# Patient Record
Sex: MALE | Race: WHITE | NOT HISPANIC OR LATINO | Employment: OTHER | URBAN - METROPOLITAN AREA
[De-identification: names, ages, dates, MRNs, and addresses within clinical notes are randomized per-mention and may not be internally consistent; named-entity substitution may affect disease eponyms.]

---

## 2020-09-01 DIAGNOSIS — R94.39 ABNORMAL RESULT OF OTHER CARDIOVASCULAR FUNCTION STUDY: Primary | ICD-10-CM

## 2020-09-04 RX ORDER — VALSARTAN AND HYDROCHLOROTHIAZIDE 320; 12.5 MG/1; MG/1
1 TABLET, FILM COATED ORAL DAILY
COMMUNITY

## 2020-09-04 RX ORDER — RIVAROXABAN 20 MG/1
20 TABLET, FILM COATED ORAL DAILY
COMMUNITY

## 2020-09-04 RX ORDER — METOPROLOL SUCCINATE 50 MG/1
50 TABLET, EXTENDED RELEASE ORAL DAILY
Status: ON HOLD | COMMUNITY
End: 2020-09-17 | Stop reason: HOSPADM

## 2020-09-04 RX ORDER — POTASSIUM CHLORIDE 750 MG/1
10 TABLET, EXTENDED RELEASE ORAL ONCE
Status: ON HOLD | COMMUNITY
End: 2020-09-17 | Stop reason: HOSPADM

## 2020-09-04 RX ORDER — MELOXICAM 7.5 MG/1
7.5 TABLET ORAL 2 TIMES DAILY
Status: ON HOLD | COMMUNITY
End: 2020-09-17 | Stop reason: HOSPADM

## 2020-09-04 RX ORDER — AMLODIPINE BESYLATE 5 MG/1
5 TABLET ORAL DAILY
COMMUNITY
End: 2020-09-30

## 2020-09-05 ENCOUNTER — LAB VISIT (OUTPATIENT)
Dept: PRIMARY CARE CLINIC | Facility: CLINIC | Age: 70
End: 2020-09-05
Payer: MEDICARE

## 2020-09-05 DIAGNOSIS — R94.39 ABNORMAL RESULT OF OTHER CARDIOVASCULAR FUNCTION STUDY: ICD-10-CM

## 2020-09-05 PROCEDURE — U0003 INFECTIOUS AGENT DETECTION BY NUCLEIC ACID (DNA OR RNA); SEVERE ACUTE RESPIRATORY SYNDROME CORONAVIRUS 2 (SARS-COV-2) (CORONAVIRUS DISEASE [COVID-19]), AMPLIFIED PROBE TECHNIQUE, MAKING USE OF HIGH THROUGHPUT TECHNOLOGIES AS DESCRIBED BY CMS-2020-01-R: HCPCS

## 2020-09-07 LAB — SARS-COV-2 RNA RESP QL NAA+PROBE: NOT DETECTED

## 2020-09-08 ENCOUNTER — HOSPITAL ENCOUNTER (INPATIENT)
Facility: HOSPITAL | Age: 70
LOS: 9 days | Discharge: HOME | DRG: 234 | End: 2020-09-17
Attending: SPECIALIST | Admitting: SPECIALIST
Payer: MEDICARE

## 2020-09-08 DIAGNOSIS — Z95.1 HX OF CABG: ICD-10-CM

## 2020-09-08 DIAGNOSIS — I25.10 ATHEROSCLEROSIS OF NATIVE CORONARY ARTERY OF NATIVE HEART WITHOUT ANGINA PECTORIS: ICD-10-CM

## 2020-09-08 DIAGNOSIS — I25.10 ATHEROSCLEROSIS OF NATIVE CORONARY ARTERY WITHOUT ANGINA PECTORIS: ICD-10-CM

## 2020-09-08 DIAGNOSIS — Z01.818 PRE-OP EVALUATION: ICD-10-CM

## 2020-09-08 DIAGNOSIS — Z95.0 PACEMAKER: ICD-10-CM

## 2020-09-08 DIAGNOSIS — I48.20 CHRONIC ATRIAL FIBRILLATION: ICD-10-CM

## 2020-09-08 DIAGNOSIS — R94.39 ABNORMAL STRESS TEST: ICD-10-CM

## 2020-09-08 DIAGNOSIS — Z09 POSTOP CHECK: ICD-10-CM

## 2020-09-08 DIAGNOSIS — I25.10 CAD, MULTIPLE VESSEL: Primary | ICD-10-CM

## 2020-09-08 DIAGNOSIS — Z95.1 S/P CABG X 4: ICD-10-CM

## 2020-09-08 DIAGNOSIS — I10 ESSENTIAL HYPERTENSION: ICD-10-CM

## 2020-09-08 PROBLEM — I25.110 ATHEROSCLEROTIC HEART DISEASE OF NATIVE CORONARY ARTERY WITH UNSTABLE ANGINA PECTORIS: Status: ACTIVE | Noted: 2020-09-08

## 2020-09-08 LAB — CATH EF QUANTITATIVE: 55 %

## 2020-09-08 PROCEDURE — 99232 SBSQ HOSP IP/OBS MODERATE 35: CPT | Mod: ,,, | Performed by: THORACIC SURGERY (CARDIOTHORACIC VASCULAR SURGERY)

## 2020-09-08 PROCEDURE — 21400001 HC TELEMETRY ROOM

## 2020-09-08 PROCEDURE — 25000003 PHARM REV CODE 250: Performed by: SPECIALIST

## 2020-09-08 PROCEDURE — 93459 L HRT ART/GRFT ANGIO: CPT

## 2020-09-08 PROCEDURE — C1725 CATH, TRANSLUMIN NON-LASER: HCPCS | Performed by: SPECIALIST

## 2020-09-08 PROCEDURE — 99153 MOD SED SAME PHYS/QHP EA: CPT | Performed by: SPECIALIST

## 2020-09-08 PROCEDURE — 25000003 PHARM REV CODE 250: Performed by: INTERNAL MEDICINE

## 2020-09-08 PROCEDURE — 27201423 OPTIME MED/SURG SUP & DEVICES STERILE SUPPLY: Performed by: SPECIALIST

## 2020-09-08 PROCEDURE — 99152 MOD SED SAME PHYS/QHP 5/>YRS: CPT | Performed by: SPECIALIST

## 2020-09-08 PROCEDURE — 25500020 PHARM REV CODE 255: Performed by: SPECIALIST

## 2020-09-08 PROCEDURE — C1769 GUIDE WIRE: HCPCS | Performed by: SPECIALIST

## 2020-09-08 PROCEDURE — 96374 THER/PROPH/DIAG INJ IV PUSH: CPT | Performed by: SPECIALIST

## 2020-09-08 PROCEDURE — 99232 PR SUBSEQUENT HOSPITAL CARE,LEVL II: ICD-10-PCS | Mod: ,,, | Performed by: THORACIC SURGERY (CARDIOTHORACIC VASCULAR SURGERY)

## 2020-09-08 PROCEDURE — 63600175 PHARM REV CODE 636 W HCPCS: Performed by: SPECIALIST

## 2020-09-08 PROCEDURE — C1887 CATHETER, GUIDING: HCPCS | Performed by: SPECIALIST

## 2020-09-08 PROCEDURE — 21000000 HC CCU ICU ROOM CHARGE

## 2020-09-08 PROCEDURE — C1894 INTRO/SHEATH, NON-LASER: HCPCS | Performed by: SPECIALIST

## 2020-09-08 PROCEDURE — C1760 CLOSURE DEV, VASC: HCPCS | Performed by: SPECIALIST

## 2020-09-08 DEVICE — DEVICE CLOSURE  ANGIO-SEAL 6FR 610130: Type: IMPLANTABLE DEVICE | Site: GROIN | Status: FUNCTIONAL

## 2020-09-08 RX ORDER — METOPROLOL SUCCINATE 50 MG/1
50 TABLET, EXTENDED RELEASE ORAL DAILY
Status: DISCONTINUED | OUTPATIENT
Start: 2020-09-09 | End: 2020-09-15

## 2020-09-08 RX ORDER — SODIUM CHLORIDE 9 MG/ML
INJECTION, SOLUTION INTRAVENOUS CONTINUOUS
Status: DISCONTINUED | OUTPATIENT
Start: 2020-09-08 | End: 2020-09-08

## 2020-09-08 RX ORDER — HYDRALAZINE HYDROCHLORIDE 20 MG/ML
10 INJECTION INTRAMUSCULAR; INTRAVENOUS ONCE
Status: COMPLETED | OUTPATIENT
Start: 2020-09-08 | End: 2020-09-08

## 2020-09-08 RX ORDER — POTASSIUM CHLORIDE 750 MG/1
10 CAPSULE, EXTENDED RELEASE ORAL ONCE
Status: DISCONTINUED | OUTPATIENT
Start: 2020-09-08 | End: 2020-09-09

## 2020-09-08 RX ORDER — ATORVASTATIN CALCIUM 40 MG/1
40 TABLET, FILM COATED ORAL DAILY
Status: DISCONTINUED | OUTPATIENT
Start: 2020-09-09 | End: 2020-09-17 | Stop reason: HOSPADM

## 2020-09-08 RX ORDER — CYCLOBENZAPRINE HCL 5 MG
5 TABLET ORAL 2 TIMES DAILY PRN
Status: DISCONTINUED | OUTPATIENT
Start: 2020-09-08 | End: 2020-09-09

## 2020-09-08 RX ORDER — AMLODIPINE BESYLATE 5 MG/1
5 TABLET ORAL DAILY
Status: DISCONTINUED | OUTPATIENT
Start: 2020-09-09 | End: 2020-09-17 | Stop reason: HOSPADM

## 2020-09-08 RX ORDER — VALSARTAN 80 MG/1
320 TABLET ORAL DAILY
Status: DISCONTINUED | OUTPATIENT
Start: 2020-09-09 | End: 2020-09-11

## 2020-09-08 RX ORDER — MIDAZOLAM HYDROCHLORIDE 1 MG/ML
INJECTION INTRAMUSCULAR; INTRAVENOUS
Status: DISCONTINUED | OUTPATIENT
Start: 2020-09-08 | End: 2020-09-08

## 2020-09-08 RX ORDER — FENTANYL CITRATE 50 UG/ML
INJECTION, SOLUTION INTRAMUSCULAR; INTRAVENOUS
Status: DISCONTINUED | OUTPATIENT
Start: 2020-09-08 | End: 2020-09-08

## 2020-09-08 RX ORDER — MELOXICAM 7.5 MG/1
7.5 TABLET ORAL 2 TIMES DAILY PRN
Status: DISCONTINUED | OUTPATIENT
Start: 2020-09-08 | End: 2020-09-09

## 2020-09-08 RX ORDER — CLONIDINE HYDROCHLORIDE 0.1 MG/1
0.2 TABLET ORAL EVERY 6 HOURS PRN
Status: DISCONTINUED | OUTPATIENT
Start: 2020-09-08 | End: 2020-09-17 | Stop reason: HOSPADM

## 2020-09-08 RX ORDER — EZETIMIBE 10 MG/1
10 TABLET ORAL DAILY
Status: DISCONTINUED | OUTPATIENT
Start: 2020-09-09 | End: 2020-09-17 | Stop reason: HOSPADM

## 2020-09-08 RX ORDER — TAMSULOSIN HYDROCHLORIDE 0.4 MG/1
0.4 CAPSULE ORAL DAILY
Status: DISCONTINUED | OUTPATIENT
Start: 2020-09-09 | End: 2020-09-09

## 2020-09-08 RX ORDER — LIDOCAINE HYDROCHLORIDE 10 MG/ML
INJECTION, SOLUTION EPIDURAL; INFILTRATION; INTRACAUDAL; PERINEURAL
Status: DISCONTINUED | OUTPATIENT
Start: 2020-09-08 | End: 2020-09-08

## 2020-09-08 RX ORDER — POTASSIUM CHLORIDE 20 MEQ/1
40 TABLET, EXTENDED RELEASE ORAL
Status: DISCONTINUED | OUTPATIENT
Start: 2020-09-08 | End: 2020-09-08

## 2020-09-08 RX ADMIN — HYDRALAZINE HYDROCHLORIDE 10 MG: 20 INJECTION INTRAMUSCULAR; INTRAVENOUS at 04:09

## 2020-09-08 RX ADMIN — CLONIDINE HYDROCHLORIDE 0.2 MG: 0.1 TABLET ORAL at 06:09

## 2020-09-08 RX ADMIN — POTASSIUM CHLORIDE 40 MEQ: 20 TABLET, EXTENDED RELEASE ORAL at 06:09

## 2020-09-08 RX ADMIN — SODIUM CHLORIDE: 0.9 INJECTION, SOLUTION INTRAVENOUS at 06:09

## 2020-09-08 NOTE — Clinical Note
Catheter is inserted into the ostium   left main. Angiography performed of the left coronary arteries in multiple views. Angiography performed via power injection. Injection was 8 mL contrast at 4 mL/s. Power injection PSI was 450.. JL4

## 2020-09-08 NOTE — Clinical Note
Catheter is inserted into the ostium   right coronary artery. Angiography performed of the right coronary arteries in multiple views. Angiography performed via power injection. Injection was 6 mL contrast at 3 mL/s. Power injection PSI was 300.. 4

## 2020-09-08 NOTE — Clinical Note
Catheter is inserted into the left ventricle. Angiography performed of the LV. Angiography performed via power injection. Injection was 24 mL contrast at 12 mL/s. Power injection PSI was 700.. pigtail

## 2020-09-08 NOTE — CONSULTS
This is Dr. Saravia dictating with date of service being 8th September 2020.  This patient has severe coronary artery disease.  He underwent heart catheterization and coronary angiography today showing significant coronary artery disease involving the left main coronary artery as well as the right coronary artery.  His left ventricular function appears to be fairly well preserved.  He has chronic atrial fibrillation for which she underwent 2 separate sessions of ablation for this and apparently this has been successful.  He still was taking prior to admission the Xarelto.  This has been discontinued.  He has had hypertension.  His other issues well described.  He has had no major recent surgery.  Family history is not pertinent at this time.  He is not a smoker.  On exam vital signs are stable.  Pupils are equal and round reactive to light.  Neck is supple.  Chest is equal breath sounds.  Heart is in a regular rate and rhythm.  Abdomen is benign.  Perfusion to the legs and feet seems to be satisfactory.  Recent studies were reviewed and the patient has severe multivessel coronary artery disease.  Left main coronary artery stenosis is noted.  Recommendation is for coronary artery bypass grafting.  I explained this to him and his wife and they both seem to be understanding and agreeable.  This will be arranged in the next 1-2 days.

## 2020-09-09 PROBLEM — I10 HTN (HYPERTENSION): Status: ACTIVE | Noted: 2020-09-09

## 2020-09-09 PROBLEM — Z95.0 PACEMAKER: Status: ACTIVE | Noted: 2020-09-09

## 2020-09-09 PROBLEM — I25.10 CAD, MULTIPLE VESSEL: Status: ACTIVE | Noted: 2020-09-09

## 2020-09-09 PROBLEM — I48.20 CHRONIC ATRIAL FIBRILLATION: Status: ACTIVE | Noted: 2020-09-09

## 2020-09-09 LAB
ALBUMIN SERPL BCP-MCNC: 3.9 G/DL (ref 3.5–5.2)
ALP SERPL-CCNC: 72 U/L (ref 55–135)
ALT SERPL W/O P-5'-P-CCNC: 34 U/L (ref 10–44)
ANION GAP SERPL CALC-SCNC: 10 MMOL/L (ref 8–16)
AST SERPL-CCNC: 28 U/L (ref 10–40)
BILIRUB SERPL-MCNC: 1.3 MG/DL (ref 0.1–1)
BUN SERPL-MCNC: 21 MG/DL (ref 8–23)
CALCIUM SERPL-MCNC: 9.1 MG/DL (ref 8.7–10.5)
CHLORIDE SERPL-SCNC: 104 MMOL/L (ref 95–110)
CO2 SERPL-SCNC: 27 MMOL/L (ref 23–29)
CREAT SERPL-MCNC: 0.9 MG/DL (ref 0.5–1.4)
ERYTHROCYTE [DISTWIDTH] IN BLOOD BY AUTOMATED COUNT: 12.6 % (ref 11.5–14.5)
EST. GFR  (AFRICAN AMERICAN): >60 ML/MIN/1.73 M^2
EST. GFR  (NON AFRICAN AMERICAN): >60 ML/MIN/1.73 M^2
GLUCOSE SERPL-MCNC: 100 MG/DL (ref 70–110)
HCT VFR BLD AUTO: 39.8 % (ref 40–54)
HGB BLD-MCNC: 13.9 G/DL (ref 14–18)
MAGNESIUM SERPL-MCNC: 2 MG/DL (ref 1.6–2.6)
MCH RBC QN AUTO: 30.9 PG (ref 27–31)
MCHC RBC AUTO-ENTMCNC: 34.9 G/DL (ref 32–36)
MCV RBC AUTO: 88 FL (ref 82–98)
PLATELET # BLD AUTO: 164 K/UL (ref 150–350)
PMV BLD AUTO: 9.3 FL (ref 9.2–12.9)
POTASSIUM SERPL-SCNC: 3.1 MMOL/L (ref 3.5–5.1)
POTASSIUM SERPL-SCNC: 3.3 MMOL/L (ref 3.5–5.1)
PROT SERPL-MCNC: 6.2 G/DL (ref 6–8.4)
RBC # BLD AUTO: 4.5 M/UL (ref 4.6–6.2)
SODIUM SERPL-SCNC: 141 MMOL/L (ref 136–145)
WBC # BLD AUTO: 7.33 K/UL (ref 3.9–12.7)

## 2020-09-09 PROCEDURE — 63600175 PHARM REV CODE 636 W HCPCS: Performed by: INTERNAL MEDICINE

## 2020-09-09 PROCEDURE — 25000003 PHARM REV CODE 250: Performed by: STUDENT IN AN ORGANIZED HEALTH CARE EDUCATION/TRAINING PROGRAM

## 2020-09-09 PROCEDURE — 84132 ASSAY OF SERUM POTASSIUM: CPT

## 2020-09-09 PROCEDURE — 21400001 HC TELEMETRY ROOM

## 2020-09-09 PROCEDURE — 83735 ASSAY OF MAGNESIUM: CPT

## 2020-09-09 PROCEDURE — 80053 COMPREHEN METABOLIC PANEL: CPT

## 2020-09-09 PROCEDURE — 25000003 PHARM REV CODE 250: Performed by: INTERNAL MEDICINE

## 2020-09-09 PROCEDURE — 85027 COMPLETE CBC AUTOMATED: CPT

## 2020-09-09 PROCEDURE — 36415 COLL VENOUS BLD VENIPUNCTURE: CPT

## 2020-09-09 RX ORDER — MAGNESIUM SULFATE HEPTAHYDRATE 40 MG/ML
2 INJECTION, SOLUTION INTRAVENOUS
Status: DISCONTINUED | OUTPATIENT
Start: 2020-09-09 | End: 2020-09-11

## 2020-09-09 RX ORDER — POTASSIUM CHLORIDE 20 MEQ/1
40 TABLET, EXTENDED RELEASE ORAL
Status: DISCONTINUED | OUTPATIENT
Start: 2020-09-09 | End: 2020-09-11

## 2020-09-09 RX ORDER — ENOXAPARIN SODIUM 100 MG/ML
40 INJECTION SUBCUTANEOUS
Status: DISCONTINUED | OUTPATIENT
Start: 2020-09-09 | End: 2020-09-11

## 2020-09-09 RX ORDER — ASPIRIN 81 MG/1
81 TABLET ORAL DAILY
Status: DISCONTINUED | OUTPATIENT
Start: 2020-09-09 | End: 2020-09-17 | Stop reason: HOSPADM

## 2020-09-09 RX ORDER — POTASSIUM CHLORIDE 20 MEQ/1
20 TABLET, EXTENDED RELEASE ORAL
Status: DISCONTINUED | OUTPATIENT
Start: 2020-09-09 | End: 2020-09-11

## 2020-09-09 RX ORDER — LANOLIN ALCOHOL/MO/W.PET/CERES
800 CREAM (GRAM) TOPICAL
Status: DISCONTINUED | OUTPATIENT
Start: 2020-09-09 | End: 2020-09-11

## 2020-09-09 RX ORDER — POTASSIUM CHLORIDE 7.45 MG/ML
20 INJECTION INTRAVENOUS
Status: DISCONTINUED | OUTPATIENT
Start: 2020-09-09 | End: 2020-09-11

## 2020-09-09 RX ORDER — ONDANSETRON 2 MG/ML
4 INJECTION INTRAMUSCULAR; INTRAVENOUS EVERY 6 HOURS PRN
Status: DISCONTINUED | OUTPATIENT
Start: 2020-09-09 | End: 2020-09-11

## 2020-09-09 RX ORDER — POTASSIUM CHLORIDE 7.45 MG/ML
40 INJECTION INTRAVENOUS
Status: DISCONTINUED | OUTPATIENT
Start: 2020-09-09 | End: 2020-09-11

## 2020-09-09 RX ORDER — MAGNESIUM SULFATE 1 G/100ML
1 INJECTION INTRAVENOUS
Status: DISCONTINUED | OUTPATIENT
Start: 2020-09-09 | End: 2020-09-11

## 2020-09-09 RX ORDER — FAMOTIDINE 20 MG/1
20 TABLET, FILM COATED ORAL 2 TIMES DAILY
Status: DISCONTINUED | OUTPATIENT
Start: 2020-09-09 | End: 2020-09-17 | Stop reason: HOSPADM

## 2020-09-09 RX ORDER — ACETAMINOPHEN 325 MG/1
650 TABLET ORAL EVERY 4 HOURS PRN
Status: DISCONTINUED | OUTPATIENT
Start: 2020-09-09 | End: 2020-09-11

## 2020-09-09 RX ORDER — MAGNESIUM SULFATE HEPTAHYDRATE 40 MG/ML
4 INJECTION, SOLUTION INTRAVENOUS
Status: DISCONTINUED | OUTPATIENT
Start: 2020-09-09 | End: 2020-09-11

## 2020-09-09 RX ADMIN — FAMOTIDINE 20 MG: 20 TABLET ORAL at 09:09

## 2020-09-09 RX ADMIN — CLONIDINE HYDROCHLORIDE 0.2 MG: 0.1 TABLET ORAL at 04:09

## 2020-09-09 RX ADMIN — METOPROLOL SUCCINATE 50 MG: 50 TABLET, FILM COATED, EXTENDED RELEASE ORAL at 09:09

## 2020-09-09 RX ADMIN — EZETIMIBE 10 MG: 10 TABLET ORAL at 09:09

## 2020-09-09 RX ADMIN — POTASSIUM CHLORIDE 40 MEQ: 20 TABLET, EXTENDED RELEASE ORAL at 09:09

## 2020-09-09 RX ADMIN — ASPIRIN 81 MG: 81 TABLET, DELAYED RELEASE ORAL at 09:09

## 2020-09-09 RX ADMIN — ATORVASTATIN CALCIUM 40 MG: 40 TABLET, FILM COATED ORAL at 09:09

## 2020-09-09 RX ADMIN — ENOXAPARIN SODIUM 40 MG: 100 INJECTION SUBCUTANEOUS at 04:09

## 2020-09-09 RX ADMIN — VALSARTAN 320 MG: 80 TABLET ORAL at 09:09

## 2020-09-09 RX ADMIN — AMLODIPINE BESYLATE 5 MG: 5 TABLET ORAL at 09:09

## 2020-09-09 NOTE — NURSING
Contacted Dr Pike answering service. Spoke with on-call physician for admission orders, referred to consult Hospitalist per Dr Medina.

## 2020-09-09 NOTE — CONSULTS
[facility] Medicine  Consult Note    Patient Name: Juwan Silver  MRN: [id number]  Admission Date: 9/8/2020  Hospital Length of Stay: 1 days  Attending Physician: Marko Pike MD   Primary Care Provider: Primary Doctor No           Patient information was obtained from patient and ER records.     Consults  Subjective:     Principal Problem: Atherosclerosis of native coronary artery without angina pectoris    Chief Complaint: No chief complaint on file.       HPI: 70 year old patient getting admitted for CABG  Patient has H/o A Fib and had several ablations done in [facility] and has been doing fine  He has PPM insitu  As a part of workup pt underwent Stress test which was abnormal  Later  cardiac cath, done yesterday  showed Multivessel CAD  Patient denies any symptoms/Chest pain . No other issues   CTS team already saw the pt . NOAC put on Hold    Past Medical History:   Diagnosis Date    A-fib     BPH (benign prostatic hyperplasia)     Hernia     Hyperlipidemia     Hypertension        Past Surgical History:   Procedure Laterality Date    ABLATION OF ARRHYTHMOGENIC FOCUS FOR ATRIAL FIBRILLATION      cholesterol      HERNIA REPAIR      hypertension         Review of patient's allergies indicates:   Allergen Reactions    Nitroglycerin Other (See Comments)     Hyper sensitive     No current facility-administered medications on file prior to encounter.      Current Outpatient Medications on File Prior to Encounter   Medication Sig    amLODIPine (NORVASC) 5 MG tablet Take 5 mg by mouth once daily.    atorvastatin (LIPITOR) 40 MG tablet Take 40 mg by mouth once daily.     cyclobenzaprine (FLEXERIL) 5 MG tablet TAKE 1 TABLET BY MOUTH TWICE DAILY AS NEEDED    meloxicam (MOBIC) 7.5 MG tablet Take 7.5 mg by mouth 2 (two) times a day.    metoprolol succinate (TOPROL-XL) 50 MG 24 hr tablet Take 50 mg by mouth once daily.    potassium chloride (KLOR-CON) 10 MEQ TbSR Take 10 mEq by mouth  once.    rivaroxaban (XARELTO) 20 mg Tab Take 20 mg by mouth once daily.    tamsulosin (FLOMAX) 0.4 mg Cp24 0.4 mg once daily.     valsartan-hydrochlorothiazide (DIOVAN-HCT) 320-12.5 mg per tablet Take 1 tablet by mouth once daily.    ZETIA 10 mg tablet Take 10 mg by mouth once daily.      Family History     Family history is unknown by patient.        Family H/o  Older Sister:CAD         Tobacco Use    Smoking status: Never Smoker    Smokeless tobacco: Never Used   Substance and Sexual Activity    Alcohol use: Not Currently     Frequency: Never    Drug use: Never    Sexual activity: Not on file     Review of Systems   Constitutional: Negative for activity change and appetite change.   HENT: Negative for congestion and dental problem.    Eyes: Negative for discharge and itching.   Respiratory: Negative for shortness of breath.    Cardiovascular: Negative for chest pain.   Gastrointestinal: Negative for abdominal distention and abdominal pain.   Endocrine: Negative for cold intolerance.   Genitourinary: Negative for difficulty urinating and dysuria.   Musculoskeletal: Negative for arthralgias and back pain.   Skin: Negative for color change.   Neurological: Negative for dizziness and facial asymmetry.   Hematological: Negative for adenopathy.   Psychiatric/Behavioral: Negative for agitation and behavioral problems.     Objective:     Vital Signs (Most Recent):  Temp: 97.5 °F (36.4 °C) (09/08/20 2300)  Pulse: 72 (09/08/20 2300)  Resp: 16 (09/08/20 2300)  BP: 121/70 (09/08/20 2300)  SpO2: 99 % (09/08/20 2300) Vital Signs (24h Range):  Temp:  [97.5 °F (36.4 °C)-98.3 °F (36.8 °C)] 97.5 °F (36.4 °C)  Pulse:  [66-78] 72  Resp:  [10-20] 16  SpO2:  [97 %-100 %] 99 %  BP: (121-180)/() 121/70     Weight: 83 kg (182 lb 15.7 oz)  Body mass index is 25.52 kg/m².    Physical Exam  Vitals signs and nursing note reviewed.   Constitutional:       Appearance: He is well-developed.   HENT:      Head: Atraumatic.       Right Ear: External ear normal.      Left Ear: External ear normal.      Nose: Nose normal.      Mouth/Throat:      Mouth: Mucous membranes are moist.   Eyes:      Conjunctiva/sclera: Conjunctivae normal.   Neck:      Musculoskeletal: Full passive range of motion without pain and normal range of motion.   Cardiovascular:      Rate and Rhythm: Normal rate.   Pulmonary:      Effort: Pulmonary effort is normal.   Abdominal:      General: There is no distension.   Musculoskeletal: Normal range of motion.   Skin:     General: Skin is warm.   Neurological:      Mental Status: He is alert and oriented to person, place, and time.   Psychiatric:         Behavior: Behavior normal.           Significant Imaging: I have reviewed all pertinent imaging results/findings within the past 24 hours.    Assessment/Plan:     * Atherosclerosis of native coronary artery without angina pectoris  In need of CABG      Pacemaker  Stable issue      Chronic atrial fibrillation  Stable issue  Had ablations before  Continue BB  Hold NOAC       HTN (hypertension)  Chronic stable issue      CAD, multiple vessel  Awaiting CABG      Abnormal stress test  Abnormal  Later Cardiac cath showed Multivessel CAD  Pt will have CABG this week         VTE Risk Mitigation (From admission, onward)         Ordered     enoxaparin injection 40 mg  Every 24 hours (non-standard times)      09/09/20 0605     IP VTE HIGH RISK PATIENT  Once      09/09/20 0539     Place sequential compression device  Until discontinued      09/09/20 0539                    Thank you for your consult. I will follow-up with patient. Please contact us if you have any additional questions.    Oliver Moreira MD  Department of Hospital Medicine   [facility]

## 2020-09-09 NOTE — SUBJECTIVE & OBJECTIVE
Past Medical History:   Diagnosis Date    A-fib     BPH (benign prostatic hyperplasia)     Hernia     Hyperlipidemia     Hypertension        Past Surgical History:   Procedure Laterality Date    ABLATION OF ARRHYTHMOGENIC FOCUS FOR ATRIAL FIBRILLATION      cholesterol      HERNIA REPAIR      hypertension         Review of patient's allergies indicates:   Allergen Reactions    Nitroglycerin Other (See Comments)     Hyper sensitive     No current facility-administered medications on file prior to encounter.      Current Outpatient Medications on File Prior to Encounter   Medication Sig    amLODIPine (NORVASC) 5 MG tablet Take 5 mg by mouth once daily.    atorvastatin (LIPITOR) 40 MG tablet Take 40 mg by mouth once daily.     cyclobenzaprine (FLEXERIL) 5 MG tablet TAKE 1 TABLET BY MOUTH TWICE DAILY AS NEEDED    meloxicam (MOBIC) 7.5 MG tablet Take 7.5 mg by mouth 2 (two) times a day.    metoprolol succinate (TOPROL-XL) 50 MG 24 hr tablet Take 50 mg by mouth once daily.    potassium chloride (KLOR-CON) 10 MEQ TbSR Take 10 mEq by mouth once.    rivaroxaban (XARELTO) 20 mg Tab Take 20 mg by mouth once daily.    tamsulosin (FLOMAX) 0.4 mg Cp24 0.4 mg once daily.     valsartan-hydrochlorothiazide (DIOVAN-HCT) 320-12.5 mg per tablet Take 1 tablet by mouth once daily.    ZETIA 10 mg tablet Take 10 mg by mouth once daily.      Family History     Family history is unknown by patient.        Family H/o  Older Sister:CAD         Tobacco Use    Smoking status: Never Smoker    Smokeless tobacco: Never Used   Substance and Sexual Activity    Alcohol use: Not Currently     Frequency: Never    Drug use: Never    Sexual activity: Not on file     Review of Systems   Constitutional: Negative for activity change and appetite change.   HENT: Negative for congestion and dental problem.    Eyes: Negative for discharge and itching.   Respiratory: Negative for shortness of breath.    Cardiovascular: Negative for chest  pain.   Gastrointestinal: Negative for abdominal distention and abdominal pain.   Endocrine: Negative for cold intolerance.   Genitourinary: Negative for difficulty urinating and dysuria.   Musculoskeletal: Negative for arthralgias and back pain.   Skin: Negative for color change.   Neurological: Negative for dizziness and facial asymmetry.   Hematological: Negative for adenopathy.   Psychiatric/Behavioral: Negative for agitation and behavioral problems.     Objective:     Vital Signs (Most Recent):  Temp: 97.5 °F (36.4 °C) (09/08/20 2300)  Pulse: 72 (09/08/20 2300)  Resp: 16 (09/08/20 2300)  BP: 121/70 (09/08/20 2300)  SpO2: 99 % (09/08/20 2300) Vital Signs (24h Range):  Temp:  [97.5 °F (36.4 °C)-98.3 °F (36.8 °C)] 97.5 °F (36.4 °C)  Pulse:  [66-78] 72  Resp:  [10-20] 16  SpO2:  [97 %-100 %] 99 %  BP: (121-180)/() 121/70     Weight: 83 kg (182 lb 15.7 oz)  Body mass index is 25.52 kg/m².    Physical Exam  Vitals signs and nursing note reviewed.   Constitutional:       Appearance: He is well-developed.   HENT:      Head: Atraumatic.      Right Ear: External ear normal.      Left Ear: External ear normal.      Nose: Nose normal.      Mouth/Throat:      Mouth: Mucous membranes are moist.   Eyes:      Conjunctiva/sclera: Conjunctivae normal.   Neck:      Musculoskeletal: Full passive range of motion without pain and normal range of motion.   Cardiovascular:      Rate and Rhythm: Normal rate.   Pulmonary:      Effort: Pulmonary effort is normal.   Abdominal:      General: There is no distension.   Musculoskeletal: Normal range of motion.   Skin:     General: Skin is warm.   Neurological:      Mental Status: He is alert and oriented to person, place, and time.   Psychiatric:         Behavior: Behavior normal.           Significant Imaging: I have reviewed all pertinent imaging results/findings within the past 24 hours.

## 2020-09-09 NOTE — PROGRESS NOTES
[facility]    Cardiology Progress Note    Subjective:  Patient is feeling well.  He is anxiously awaiting surgery which I believe may be scheduled for tomorrow.  He denies any shortness of breath or chest pain.  He is quite comfortable      Objective:  Vital Signs (Most Recent)  Temp: 98.1 °F (36.7 °C) (09/09/20 0754)  Pulse: 67 (09/09/20 0754)  Resp: 18 (09/09/20 0754)  BP: (!) 141/76 (09/09/20 0754)  SpO2: 97 % (09/09/20 0754)    Vital Signs Range (Last 24H):  Temp:  [97.5 °F (36.4 °C)-98.3 °F (36.8 °C)]   Pulse:  [66-73]   Resp:  [10-20]   BP: (121-180)/()   SpO2:  [97 %-100 %]     I & O (Last 24H):    Intake/Output Summary (Last 24 hours) at 9/9/2020 0837  Last data filed at 9/9/2020 0720  Gross per 24 hour   Intake 150 ml   Output 450 ml   Net -300 ml       Current Diet:     Current Diet Order   Procedures    Diet Cardiac        Allergies:  Review of patient's allergies indicates:   Allergen Reactions    Nitroglycerin Other (See Comments)     Hyper sensitive       Meds:  Scheduled Meds:   amLODIPine  5 mg Oral Daily    aspirin  81 mg Oral Daily    atorvastatin  40 mg Oral Daily    enoxparin  40 mg Subcutaneous Q24H    ezetimibe  10 mg Oral Daily    famotidine  20 mg Oral BID    metoprolol succinate  50 mg Oral Daily    valsartan  320 mg Oral Daily     Continuous Infusions:  PRN Meds:acetaminophen, calcium chloride IVPB, calcium chloride IVPB, calcium chloride IVPB, cloNIDine, magnesium oxide, magnesium sulfate IVPB, magnesium sulfate IVPB, magnesium sulfate IVPB, magnesium sulfate IVPB, ondansetron, potassium chloride in water, potassium chloride in water, potassium chloride in water, potassium chloride in water, potassium chloride, potassium chloride, potassium chloride, potassium chloride, sodium phosphate IVPB, sodium phosphate IVPB, sodium phosphate IVPB, sodium phosphate IVPB, sodium phosphate IVPB    Lab Results :  Recent Results (from the past 24 hour(s))   CBC Without  "Differential    Collection Time: 09/09/20  6:36 AM   Result Value Ref Range    WBC 7.33 3.90 - 12.70 K/uL    RBC 4.50 (L) 4.60 - 6.20 M/uL    Hemoglobin 13.9 (L) 14.0 - 18.0 g/dL    Hematocrit 39.8 (L) 40.0 - 54.0 %    Mean Corpuscular Volume 88 82 - 98 fL    Mean Corpuscular Hemoglobin 30.9 27.0 - 31.0 pg    Mean Corpuscular Hemoglobin Conc 34.9 32.0 - 36.0 g/dL    RDW 12.6 11.5 - 14.5 %    Platelets 164 150 - 350 K/uL    MPV 9.3 9.2 - 12.9 fL   Comprehensive metabolic panel    Collection Time: 09/09/20  6:36 AM   Result Value Ref Range    Sodium 141 136 - 145 mmol/L    Potassium 3.1 (L) 3.5 - 5.1 mmol/L    Chloride 104 95 - 110 mmol/L    CO2 27 23 - 29 mmol/L    Glucose 100 70 - 110 mg/dL    BUN, Bld 21 8 - 23 mg/dL    Creatinine 0.9 0.5 - 1.4 mg/dL    Calcium 9.1 8.7 - 10.5 mg/dL    Total Protein 6.2 6.0 - 8.4 g/dL    Albumin 3.9 3.5 - 5.2 g/dL    Total Bilirubin 1.3 (H) 0.1 - 1.0 mg/dL    Alkaline Phosphatase 72 55 - 135 U/L    AST 28 10 - 40 U/L    ALT 34 10 - 44 U/L    Anion Gap 10 8 - 16 mmol/L    eGFR if African American >60.0 >60 mL/min/1.73 m^2    eGFR if non African American >60.0 >60 mL/min/1.73 m^2       Diagnostic Results:      Recent Cardiac Rhythm   (if applicable)      Physical Exam:  Objective:  General Appearance:  Comfortable.    Vital signs: (most recent): Blood pressure (!) 141/76, pulse 67, temperature 98.1 °F (36.7 °C), temperature source Oral, resp. rate 18, height 5' 11" (1.803 m), weight 83 kg (182 lb 15.7 oz), SpO2 97 %.    Lungs:  Normal effort and normal respiratory rate.  Breath sounds clear to auscultation.    Heart: Normal rate.  Regular rhythm.  S1 normal and S2 normal.  No gallop or friction rub.   Abdomen: Abdomen is soft.  Bowel sounds are normal.   There is no abdominal tenderness.         Current Consults:  IP CONSULT TO CARDIOTHORACIC SURGERY  IP CONSULT TO HOSPITALIST    Assessment/Plan:  Assessment:   Coronary artery disease left main disease  History of paroxysmal atrial " fibrillation  Status post multiple ablations  History of DDD pacemaker  Hypertension  Hyperlipidemia  BPH    Plan:   For aorta coronary bypass

## 2020-09-09 NOTE — HPI
70 year old patient getting admitted for CABG  Patient has H/o A Fib and had several ablations done in [facility] and has been doing fine  He has PPM insitu  As a part of workup pt underwent Stress test which was abnormal  Later  cardiac cath, done yesterday  showed Multivessel CAD  Patient denies any symptoms/Chest pain . No other issues   CTS team already saw the pt . NOAC put on Hold

## 2020-09-09 NOTE — PROGRESS NOTES
Hospital Medicine Rounding Note    69 yo M admitted for CABG. Seen earlier today by hospital medicine for consult by Dr. Moreira.   H&P, consult note, labs, imaging reviewed and patient seen and examined this morning. Agree with current plan.     Will continue to follow.     Jessica Lott D.O.  09/09/2020

## 2020-09-10 ENCOUNTER — ANESTHESIA EVENT (OUTPATIENT)
Dept: SURGERY | Facility: HOSPITAL | Age: 70
DRG: 234 | End: 2020-09-10
Payer: MEDICARE

## 2020-09-10 LAB
ABO + RH BLD: NORMAL
ALBUMIN SERPL BCP-MCNC: 4 G/DL (ref 3.5–5.2)
ALP SERPL-CCNC: 76 U/L (ref 55–135)
ALT SERPL W/O P-5'-P-CCNC: 31 U/L (ref 10–44)
ANION GAP SERPL CALC-SCNC: 7 MMOL/L (ref 8–16)
AST SERPL-CCNC: 28 U/L (ref 10–40)
BASOPHILS # BLD AUTO: 0.02 K/UL (ref 0–0.2)
BASOPHILS NFR BLD: 0.3 % (ref 0–1.9)
BILIRUB SERPL-MCNC: 1.4 MG/DL (ref 0.1–1)
BLD GP AB SCN CELLS X3 SERPL QL: NORMAL
BUN SERPL-MCNC: 21 MG/DL (ref 8–23)
CALCIUM SERPL-MCNC: 9.2 MG/DL (ref 8.7–10.5)
CHLORIDE SERPL-SCNC: 105 MMOL/L (ref 95–110)
CO2 SERPL-SCNC: 25 MMOL/L (ref 23–29)
CREAT SERPL-MCNC: 0.9 MG/DL (ref 0.5–1.4)
DIFFERENTIAL METHOD: ABNORMAL
EOSINOPHIL # BLD AUTO: 0.2 K/UL (ref 0–0.5)
EOSINOPHIL NFR BLD: 2.6 % (ref 0–8)
ERYTHROCYTE [DISTWIDTH] IN BLOOD BY AUTOMATED COUNT: 12.4 % (ref 11.5–14.5)
EST. GFR  (AFRICAN AMERICAN): >60 ML/MIN/1.73 M^2
EST. GFR  (NON AFRICAN AMERICAN): >60 ML/MIN/1.73 M^2
GLUCOSE SERPL-MCNC: 107 MG/DL (ref 70–110)
HCT VFR BLD AUTO: 40.9 % (ref 40–54)
HGB BLD-MCNC: 14.2 G/DL (ref 14–18)
IMM GRANULOCYTES # BLD AUTO: 0.01 K/UL (ref 0–0.04)
IMM GRANULOCYTES NFR BLD AUTO: 0.2 % (ref 0–0.5)
LYMPHOCYTES # BLD AUTO: 2.2 K/UL (ref 1–4.8)
LYMPHOCYTES NFR BLD: 34.3 % (ref 18–48)
MAGNESIUM SERPL-MCNC: 2.1 MG/DL (ref 1.6–2.6)
MCH RBC QN AUTO: 31.3 PG (ref 27–31)
MCHC RBC AUTO-ENTMCNC: 34.7 G/DL (ref 32–36)
MCV RBC AUTO: 90 FL (ref 82–98)
MONOCYTES # BLD AUTO: 0.6 K/UL (ref 0.3–1)
MONOCYTES NFR BLD: 8.4 % (ref 4–15)
NEUTROPHILS # BLD AUTO: 3.6 K/UL (ref 1.8–7.7)
NEUTROPHILS NFR BLD: 54.2 % (ref 38–73)
NRBC BLD-RTO: 0 /100 WBC
PLATELET # BLD AUTO: 173 K/UL (ref 150–350)
PMV BLD AUTO: 9.4 FL (ref 9.2–12.9)
POTASSIUM SERPL-SCNC: 3.2 MMOL/L (ref 3.5–5.1)
POTASSIUM SERPL-SCNC: 3.3 MMOL/L (ref 3.5–5.1)
PROT SERPL-MCNC: 6.3 G/DL (ref 6–8.4)
RBC # BLD AUTO: 4.54 M/UL (ref 4.6–6.2)
SODIUM SERPL-SCNC: 137 MMOL/L (ref 136–145)
WBC # BLD AUTO: 6.54 K/UL (ref 3.9–12.7)

## 2020-09-10 PROCEDURE — 86901 BLOOD TYPING SEROLOGIC RH(D): CPT

## 2020-09-10 PROCEDURE — 86920 COMPATIBILITY TEST SPIN: CPT

## 2020-09-10 PROCEDURE — 80053 COMPREHEN METABOLIC PANEL: CPT

## 2020-09-10 PROCEDURE — 84132 ASSAY OF SERUM POTASSIUM: CPT

## 2020-09-10 PROCEDURE — 85025 COMPLETE CBC W/AUTO DIFF WBC: CPT

## 2020-09-10 PROCEDURE — 83735 ASSAY OF MAGNESIUM: CPT

## 2020-09-10 PROCEDURE — 63600175 PHARM REV CODE 636 W HCPCS: Performed by: INTERNAL MEDICINE

## 2020-09-10 PROCEDURE — 94010 BREATHING CAPACITY TEST: CPT

## 2020-09-10 PROCEDURE — 25000003 PHARM REV CODE 250: Performed by: STUDENT IN AN ORGANIZED HEALTH CARE EDUCATION/TRAINING PROGRAM

## 2020-09-10 PROCEDURE — 20000000 HC ICU ROOM

## 2020-09-10 PROCEDURE — 25000003 PHARM REV CODE 250

## 2020-09-10 PROCEDURE — 25000003 PHARM REV CODE 250: Performed by: INTERNAL MEDICINE

## 2020-09-10 PROCEDURE — 36415 COLL VENOUS BLD VENIPUNCTURE: CPT

## 2020-09-10 RX ORDER — CALCIUM CHLORIDE, MAGNESIUM CHLORIDE, POTASSIUM CHLORIDE AND SODIUM CHLORIDE 17.6; 325.3; 119.3; 643 MG/100ML; MG/100ML; MG/100ML; MG/100ML
SOLUTION INTRA-ARTERIAL ONCE
Status: DISCONTINUED | OUTPATIENT
Start: 2020-09-11 | End: 2020-09-11

## 2020-09-10 RX ORDER — HYDROCODONE BITARTRATE AND ACETAMINOPHEN 500; 5 MG/1; MG/1
TABLET ORAL
Status: DISCONTINUED | OUTPATIENT
Start: 2020-09-10 | End: 2020-09-11

## 2020-09-10 RX ADMIN — ASPIRIN 81 MG: 81 TABLET, DELAYED RELEASE ORAL at 09:09

## 2020-09-10 RX ADMIN — AMLODIPINE BESYLATE 5 MG: 5 TABLET ORAL at 09:09

## 2020-09-10 RX ADMIN — POTASSIUM CHLORIDE 40 MEQ: 20 TABLET, EXTENDED RELEASE ORAL at 09:09

## 2020-09-10 RX ADMIN — METOPROLOL SUCCINATE 50 MG: 50 TABLET, FILM COATED, EXTENDED RELEASE ORAL at 09:09

## 2020-09-10 RX ADMIN — VALSARTAN 320 MG: 80 TABLET ORAL at 09:09

## 2020-09-10 RX ADMIN — EZETIMIBE 10 MG: 10 TABLET ORAL at 09:09

## 2020-09-10 RX ADMIN — ENOXAPARIN SODIUM 40 MG: 100 INJECTION SUBCUTANEOUS at 05:09

## 2020-09-10 RX ADMIN — FAMOTIDINE 20 MG: 20 TABLET ORAL at 09:09

## 2020-09-10 RX ADMIN — ATORVASTATIN CALCIUM 40 MG: 40 TABLET, FILM COATED ORAL at 09:09

## 2020-09-10 NOTE — PLAN OF CARE
Plan of care, medication regime, and safety protocols discussed with pt. Pt verbalized understanding.

## 2020-09-10 NOTE — ANESTHESIA PREPROCEDURE EVALUATION
09/10/2020  Juwan Silver is a 70 y.o., male.      Patient Active Problem List   Diagnosis    Abnormal stress test    Atherosclerosis of native coronary artery without angina pectoris    CAD, multiple vessel    HTN (hypertension)    Chronic atrial fibrillation    Pacemaker       Past Surgical History:   Procedure Laterality Date    ABLATION OF ARRHYTHMOGENIC FOCUS FOR ATRIAL FIBRILLATION      ANGIOGRAM, CORONARY, WITH LEFT HEART CATHETERIZATION Left 9/8/2020    Procedure: ANGIOGRAM,CORONARY,WITH LEFT HEART CATHETERIZATION;  Surgeon: Marko Pike MD;  Location: [facility] CATH/EP LAB;  Service: Cardiology;  Laterality: Left;    cholesterol      HERNIA REPAIR      hypertension          Tobacco Use:  The patient  reports that he has never smoked. He has never used smokeless tobacco.     No results found for this or any previous visit.          Lab Results   Component Value Date    WBC 6.54 09/10/2020    HGB 14.2 09/10/2020    HCT 40.9 09/10/2020    MCV 90 09/10/2020     09/10/2020     BMP  Lab Results   Component Value Date     09/10/2020    K 3.3 (L) 09/10/2020     09/10/2020    CO2 25 09/10/2020    BUN 21 09/10/2020    CREATININE 0.9 09/10/2020    CALCIUM 9.2 09/10/2020    ANIONGAP 7 (L) 09/10/2020     09/10/2020     09/09/2020       No results found for this or any previous visit.          Anesthesia Evaluation    I have reviewed the Patient Summary Reports.    I have reviewed the Nursing Notes. I have reviewed the NPO Status.   I have reviewed the Medications.     Review of Systems  Anesthesia Hx:  No problems with previous Anesthesia    Social:  Non-Smoker, No Alcohol Use    Hematology/Oncology:  Hematology Normal   Oncology Normal     EENT/Dental:EENT/Dental Normal   Cardiovascular:   Pacemaker Hypertension CAD  Dysrhythmias (s/p ablation x2) atrial fibrillation     Pulmonary:  Pulmonary Normal    Renal/:  Renal/ Normal     Hepatic/GI:  Hepatic/GI Normal    Musculoskeletal:  Musculoskeletal Normal    Endocrine:  Endocrine Normal    Dermatological:  Skin Normal    Psych:  Psychiatric Normal              Anesthesia Plan  Type of Anesthesia, risks & benefits discussed:  Anesthesia Type:  general  Patient's Preference:   Intra-op Monitoring Plan: standard ASA monitors, arterial line, central line, [place] and cardiac output  Intra-op Monitoring Plan Comments:   Post Op Pain Control Plan: multimodal analgesia, per primary service following discharge from PACU and IV/PO Opioids PRN  Post Op Pain Control Plan Comments:   Induction:   IV  Beta Blocker:  Patient is not currently on a Beta-Blocker (No further documentation required).       Informed Consent: Patient understands risks and agrees with Anesthesia plan.  Questions answered. Anesthesia consent signed with patient.  ASA Score: 4     Day of Surgery Review of History & Physical:        Anesthesia Plan Notes: GETA; check K+ morning of surgery  Art Line  CVC x 2  [place] Cath          Ready For Surgery From Anesthesia Perspective.

## 2020-09-10 NOTE — PROGRESS NOTES
[facility] Medicine  Progress Note    Patient Name: Juwan Silver  MRN: [id number]  Patient Class: IP- Inpatient   Admission Date: 9/8/2020  Length of Stay: 2 days  Attending Physician: Marko Pike MD  Primary Care Provider: Primary Doctor No        Subjective:     Principal Problem:Atherosclerosis of native coronary artery without angina pectoris    Interval History: afvss, plans for CABG tomorrow.   Denies any CP, SOB, N/V/D, headaches, fever or chills.       Review of Systems   Per interval history, all other systems reviewed and negative.     Objective:     Vital Signs (Most Recent):  Temp: (P) 98.3 °F (36.8 °C) (09/10/20 0700)  Pulse: 76(paced rhythm) (09/10/20 0300)  Resp: 13 (09/10/20 0300)  BP: (!) 147/77 (09/10/20 0300)  SpO2: 98 % (09/10/20 0300) Vital Signs (24h Range):  Temp:  [97.6 °F (36.4 °C)-98.3 °F (36.8 °C)] (P) 98.3 °F (36.8 °C)  Pulse:  [68-76] 76  Resp:  [12-23] 13  SpO2:  [98 %-100 %] 98 %  BP: (109-180)/() 147/77     Weight: 88.9 kg (195 lb 15.8 oz)  Body mass index is 27.33 kg/m².  No intake or output data in the 24 hours ending 09/10/20 1023   Physical Exam  Vitals signs and nursing note reviewed.   Constitutional:       Appearance: He is well-developed.   HENT:      Head: Atraumatic.      Right Ear: External ear normal.      Left Ear: External ear normal.      Nose: Nose normal.      Mouth/Throat:      Mouth: Mucous membranes are moist.   Eyes:      Conjunctiva/sclera: Conjunctivae normal.   Neck:      Musculoskeletal: Full passive range of motion without pain and normal range of motion.   Cardiovascular:      Rate and Rhythm: Normal rate.   Pulmonary:      Effort: Pulmonary effort is normal.   Abdominal:      General: There is no distension.   Musculoskeletal: Normal range of motion.   Skin:     General: Skin is warm.   Neurological:      Mental Status: He is alert and oriented to person, place, and time.   Psychiatric:         Behavior: Behavior normal.          Significant Labs:   CBC:   Recent Labs   Lab 09/09/20  0636 09/10/20  0512   WBC 7.33 6.54   HGB 13.9* 14.2   HCT 39.8* 40.9    173     CMP:   Recent Labs   Lab 09/09/20  0636 09/09/20  1547 09/10/20  0512     --  137   K 3.1* 3.3* 3.3*     --  105   CO2 27  --  25     --  107   BUN 21  --  21   CREATININE 0.9  --  0.9   CALCIUM 9.1  --  9.2   PROT 6.2  --  6.3   ALBUMIN 3.9  --  4.0   BILITOT 1.3*  --  1.4*   ALKPHOS 72  --  76   AST 28  --  28   ALT 34  --  31   ANIONGAP 10  --  7*   EGFRNONAA >60.0  --  >60.0   Magnesium:   Recent Labs   Lab 09/09/20  0636 09/10/20  0512   MG 2.0 2.1     All pertinent labs within the past 24 hours have been reviewed.    Significant Imaging    CUS  MPRESSION:  Mild scattered atherosclerotic plaques bilaterally with no  hemodynamically significant stenosis identified.        Assessment/Plan:      Active Hospital Problems    Diagnosis  POA    *Atherosclerosis of native coronary artery without angina pectoris [I25.10]  Unknown    CAD, multiple vessel [I25.10]  Unknown    HTN (hypertension) [I10]  Unknown    Chronic atrial fibrillation [I48.20]  Unknown    Pacemaker [Z95.0]  Unknown    Abnormal stress test [R94.39]  Yes      Resolved Hospital Problems   No resolved problems to display.     Plan:  Plan for CABG tomorrow   Continue home medications (amlodipine, asa, statin, metoprolol, valsartan, ezetimibe)  Holding NOAC  CUS: no significant stenosis  Electrolyte replacement per sliding scale  Diet: cardiac then NPO @MN   Gi ppx: pepcid  Code: full    VTE Risk Mitigation (From admission, onward)         Ordered     enoxaparin injection 40 mg  Every 24 hours (non-standard times)      09/09/20 0605     IP VTE HIGH RISK PATIENT  Once      09/09/20 0539     Place sequential compression device  Until discontinued      09/09/20 0539                Discharge Planning   KELVIN:      Code Status: Full Code   Is the patient medically ready for discharge?:      Reason for patient still in hospital (select all that apply): Treatment             Jessica Lott DO  Department of Hospital Medicine   [facility]

## 2020-09-10 NOTE — PROGRESS NOTES
[facility]    Cardiology Progress Note    Subjective:  Patient seen and examined this AM. Denies any active cardiac complaints. VSS. Carotid US pending. CABG scheduled for tomorrow AM. Hemoglobin stable.     Objective:  Vital Signs (Most Recent)  Temp: (P) 98.3 °F (36.8 °C) (09/10/20 0700)  Pulse: 76(paced rhythm) (09/10/20 0300)  Resp: 13 (09/10/20 0300)  BP: (!) 147/77 (09/10/20 0300)  SpO2: 98 % (09/10/20 0300)    Vital Signs Range (Last 24H):  Temp:  [97.6 °F (36.4 °C)-98.3 °F (36.8 °C)]   Pulse:  [68-76]   Resp:  [12-23]   BP: (109-180)/()   SpO2:  [98 %-100 %]     I & O (Last 24H):  No intake or output data in the 24 hours ending 09/10/20 0903    Current Diet:     Current Diet Order   Procedures    Diet Cardiac    Diet NPO        Allergies:  Review of patient's allergies indicates:   Allergen Reactions    Nitroglycerin Other (See Comments)     Hyper sensitive       Meds:  Scheduled Meds:   amLODIPine  5 mg Oral Daily    aspirin  81 mg Oral Daily    atorvastatin  40 mg Oral Daily    enoxparin  40 mg Subcutaneous Q24H    ezetimibe  10 mg Oral Daily    famotidine  20 mg Oral BID    metoprolol succinate  50 mg Oral Daily    valsartan  320 mg Oral Daily     Continuous Infusions:  PRN Meds:sodium chloride, acetaminophen, calcium chloride IVPB, calcium chloride IVPB, calcium chloride IVPB, cloNIDine, magnesium oxide, magnesium sulfate IVPB, magnesium sulfate IVPB, magnesium sulfate IVPB, magnesium sulfate IVPB, ondansetron, potassium chloride in water, potassium chloride in water, potassium chloride in water, potassium chloride in water, potassium chloride, potassium chloride, potassium chloride, potassium chloride, sodium phosphate IVPB, sodium phosphate IVPB, sodium phosphate IVPB, sodium phosphate IVPB, sodium phosphate IVPB    Lab Results :  Recent Results (from the past 24 hour(s))   Potassium    Collection Time: 09/09/20  3:47 PM   Result Value Ref Range    Potassium 3.3 (L) 3.5 -  "5.1 mmol/L   Comprehensive Metabolic Panel (CMP)    Collection Time: 09/10/20  5:12 AM   Result Value Ref Range    Sodium 137 136 - 145 mmol/L    Potassium 3.3 (L) 3.5 - 5.1 mmol/L    Chloride 105 95 - 110 mmol/L    CO2 25 23 - 29 mmol/L    Glucose 107 70 - 110 mg/dL    BUN, Bld 21 8 - 23 mg/dL    Creatinine 0.9 0.5 - 1.4 mg/dL    Calcium 9.2 8.7 - 10.5 mg/dL    Total Protein 6.3 6.0 - 8.4 g/dL    Albumin 4.0 3.5 - 5.2 g/dL    Total Bilirubin 1.4 (H) 0.1 - 1.0 mg/dL    Alkaline Phosphatase 76 55 - 135 U/L    AST 28 10 - 40 U/L    ALT 31 10 - 44 U/L    Anion Gap 7 (L) 8 - 16 mmol/L    eGFR if African American >60.0 >60 mL/min/1.73 m^2    eGFR if non African American >60.0 >60 mL/min/1.73 m^2   Magnesium    Collection Time: 09/10/20  5:12 AM   Result Value Ref Range    Magnesium 2.1 1.6 - 2.6 mg/dL   CBC with Automated Differential    Collection Time: 09/10/20  5:12 AM   Result Value Ref Range    WBC 6.54 3.90 - 12.70 K/uL    RBC 4.54 (L) 4.60 - 6.20 M/uL    Hemoglobin 14.2 14.0 - 18.0 g/dL    Hematocrit 40.9 40.0 - 54.0 %    Mean Corpuscular Volume 90 82 - 98 fL    Mean Corpuscular Hemoglobin 31.3 (H) 27.0 - 31.0 pg    Mean Corpuscular Hemoglobin Conc 34.7 32.0 - 36.0 g/dL    RDW 12.4 11.5 - 14.5 %    Platelets 173 150 - 350 K/uL    MPV 9.4 9.2 - 12.9 fL    Immature Granulocytes 0.2 0.0 - 0.5 %    Gran # (ANC) 3.6 1.8 - 7.7 K/uL    Immature Grans (Abs) 0.01 0.00 - 0.04 K/uL    Lymph # 2.2 1.0 - 4.8 K/uL    Mono # 0.6 0.3 - 1.0 K/uL    Eos # 0.2 0.0 - 0.5 K/uL    Baso # 0.02 0.00 - 0.20 K/uL    nRBC 0 0 /100 WBC    Gran% 54.2 38.0 - 73.0 %    Lymph% 34.3 18.0 - 48.0 %    Mono% 8.4 4.0 - 15.0 %    Eosinophil% 2.6 0.0 - 8.0 %    Basophil% 0.3 0.0 - 1.9 %    Differential Method Automated          Physical Exam:  Objective:  General Appearance:  Comfortable.    Vital signs: (most recent): Blood pressure (!) 147/77, pulse 76, temperature (P) 98.3 °F (36.8 °C), resp. rate 13, height 5' 11" (1.803 m), weight 88.9 kg (195 lb " 15.8 oz), SpO2 98 %.    Lungs:  Normal effort and normal respiratory rate.  Breath sounds clear to auscultation.    Heart: Normal rate.  Regular rhythm.  S1 normal and S2 normal.  No gallop or friction rub.   Abdomen: Abdomen is soft.  Bowel sounds are normal.   There is no abdominal tenderness.         Current Consults:  IP CONSULT TO CARDIOTHORACIC SURGERY  IP CONSULT TO HOSPITALIST    Assessment/Plan:  Assessment:   Coronary artery disease left main disease  History of paroxysmal atrial fibrillation  Status post multiple ablations  History of DDD pacemaker  Hypertension  Hyperlipidemia  BPH    Plan:   CABG tomorrow.  Awaiting carotid US results.  Continue current cardiac medications.     Chris Dubon PA-C  09/10/2020

## 2020-09-10 NOTE — PLAN OF CARE
Pt assessment completed at bedside. Pt was alone during assessment. Pt reports he currently lives w/ his spouse. Pt reports he has 3 adult children. Pt reports he does not have a living will. Pt reports he does not have a primary care provider. Pt reports he intends to discharge home with family and family will provide transport. Pt denies any other barriers at the current as case management will continue to follow.        09/10/20 1112   Discharge Assessment   Assessment Type Discharge Planning Assessment   Confirmed/corrected address and phone number on facesheet? Yes  (* [address] [place] [place] [address])   Communicated expected length of stay with patient/caregiver no   Prior to hospitilization cognitive status: Alert/Oriented   Prior to hospitalization functional status: Independent   Current cognitive status: Alert/Oriented   Current Functional Status: Independent   Facility Arrived From: home   Lives With spouse   Able to Return to Prior Arrangements yes   Is patient able to care for self after discharge? Yes   Who are your caregiver(s) and their phone number(s)? Tasha weir spouse [phone]   Patient's perception of discharge disposition home or selfcare   Readmission Within the Last 30 Days no previous admission in last 30 days   Patient currently being followed by outpatient case management? No   Patient currently receives any other outside agency services? No   Equipment Currently Used at Home none   Do you have any problems affording any of your prescribed medications? No   Is the patient taking medications as prescribed? yes   Does the patient have transportation home? Yes   Transportation Anticipated family or friend will provide   Dialysis Name and Scheduled days n/a   Does the patient receive services at the Coumadin Clinic? No   Discharge Plan A Home with family   Discharge Plan B Home with family   DME Needed Upon Discharge  none   Patient/Family in Agreement  with Plan yes

## 2020-09-10 NOTE — SUBJECTIVE & OBJECTIVE
Interval History: afvss, plans for CABG tomorrow.   Denies any CP, SOB, N/V/D, headaches, fever or chills.       Review of Systems   Per interval history, all other systems reviewed and negative.     Objective:     Vital Signs (Most Recent):  Temp: (P) 98.3 °F (36.8 °C) (09/10/20 0700)  Pulse: 76(paced rhythm) (09/10/20 0300)  Resp: 13 (09/10/20 0300)  BP: (!) 147/77 (09/10/20 0300)  SpO2: 98 % (09/10/20 0300) Vital Signs (24h Range):  Temp:  [97.6 °F (36.4 °C)-98.3 °F (36.8 °C)] (P) 98.3 °F (36.8 °C)  Pulse:  [68-76] 76  Resp:  [12-23] 13  SpO2:  [98 %-100 %] 98 %  BP: (109-180)/() 147/77     Weight: 88.9 kg (195 lb 15.8 oz)  Body mass index is 27.33 kg/m².  No intake or output data in the 24 hours ending 09/10/20 1023   Physical Exam  Vitals signs and nursing note reviewed.   Constitutional:       Appearance: He is well-developed.   HENT:      Head: Atraumatic.      Right Ear: External ear normal.      Left Ear: External ear normal.      Nose: Nose normal.      Mouth/Throat:      Mouth: Mucous membranes are moist.   Eyes:      Conjunctiva/sclera: Conjunctivae normal.   Neck:      Musculoskeletal: Full passive range of motion without pain and normal range of motion.   Cardiovascular:      Rate and Rhythm: Normal rate.   Pulmonary:      Effort: Pulmonary effort is normal.   Abdominal:      General: There is no distension.   Musculoskeletal: Normal range of motion.   Skin:     General: Skin is warm.   Neurological:      Mental Status: He is alert and oriented to person, place, and time.   Psychiatric:         Behavior: Behavior normal.         Significant Labs:   CBC:   Recent Labs   Lab 09/09/20  0636 09/10/20  0512   WBC 7.33 6.54   HGB 13.9* 14.2   HCT 39.8* 40.9    173     CMP:   Recent Labs   Lab 09/09/20  0636 09/09/20  1547 09/10/20  0512     --  137   K 3.1* 3.3* 3.3*     --  105   CO2 27  --  25     --  107   BUN 21  --  21   CREATININE 0.9  --  0.9   CALCIUM 9.1  --  9.2    PROT 6.2  --  6.3   ALBUMIN 3.9  --  4.0   BILITOT 1.3*  --  1.4*   ALKPHOS 72  --  76   AST 28  --  28   ALT 34  --  31   ANIONGAP 10  --  7*   EGFRNONAA >60.0  --  >60.0   Magnesium:   Recent Labs   Lab 09/09/20  0636 09/10/20  0512   MG 2.0 2.1     All pertinent labs within the past 24 hours have been reviewed.    Significant Imaging    CUS  MPRESSION:  Mild scattered atherosclerotic plaques bilaterally with no  hemodynamically significant stenosis identified.

## 2020-09-11 ENCOUNTER — ANESTHESIA (OUTPATIENT)
Dept: SURGERY | Facility: HOSPITAL | Age: 70
DRG: 234 | End: 2020-09-11
Payer: MEDICARE

## 2020-09-11 LAB
ALBUMIN SERPL BCP-MCNC: 4.1 G/DL (ref 3.5–5.2)
ALLENS TEST: ABNORMAL
ALP SERPL-CCNC: 77 U/L (ref 55–135)
ALT SERPL W/O P-5'-P-CCNC: 35 U/L (ref 10–44)
ANION GAP SERPL CALC-SCNC: 5 MMOL/L (ref 8–16)
ANION GAP SERPL CALC-SCNC: 7 MMOL/L (ref 8–16)
ANION GAP SERPL CALC-SCNC: 9 MMOL/L (ref 8–16)
ANION GAP SERPL CALC-SCNC: 9 MMOL/L (ref 8–16)
APTT PPP: 34.4 SEC (ref 23.6–33.3)
AST SERPL-CCNC: 29 U/L (ref 10–40)
BASOPHILS # BLD AUTO: 0.01 K/UL (ref 0–0.2)
BASOPHILS # BLD AUTO: 0.02 K/UL (ref 0–0.2)
BASOPHILS NFR BLD: 0.1 % (ref 0–1.9)
BASOPHILS NFR BLD: 0.1 % (ref 0–1.9)
BASOPHILS NFR BLD: 0.2 % (ref 0–1.9)
BASOPHILS NFR BLD: 0.3 % (ref 0–1.9)
BILIRUB SERPL-MCNC: 1.1 MG/DL (ref 0.1–1)
BUN SERPL-MCNC: 17 MG/DL (ref 8–23)
BUN SERPL-MCNC: 19 MG/DL (ref 8–23)
BUN SERPL-MCNC: 19 MG/DL (ref 8–23)
BUN SERPL-MCNC: 21 MG/DL (ref 8–23)
CALCIUM SERPL-MCNC: 7.4 MG/DL (ref 8.7–10.5)
CALCIUM SERPL-MCNC: 7.6 MG/DL (ref 8.7–10.5)
CALCIUM SERPL-MCNC: 7.8 MG/DL (ref 8.7–10.5)
CALCIUM SERPL-MCNC: 8.9 MG/DL (ref 8.7–10.5)
CHLORIDE SERPL-SCNC: 106 MMOL/L (ref 95–110)
CHLORIDE SERPL-SCNC: 113 MMOL/L (ref 95–110)
CHLORIDE SERPL-SCNC: 115 MMOL/L (ref 95–110)
CHLORIDE SERPL-SCNC: 115 MMOL/L (ref 95–110)
CO2 SERPL-SCNC: 21 MMOL/L (ref 23–29)
CO2 SERPL-SCNC: 25 MMOL/L (ref 23–29)
CREAT SERPL-MCNC: 0.8 MG/DL (ref 0.5–1.4)
CREAT SERPL-MCNC: 0.9 MG/DL (ref 0.5–1.4)
CREAT SERPL-MCNC: 1 MG/DL (ref 0.5–1.4)
CREAT SERPL-MCNC: 1.1 MG/DL (ref 0.5–1.4)
DELSYS: ABNORMAL
DIFFERENTIAL METHOD: ABNORMAL
DIFFERENTIAL METHOD: NORMAL
EOSINOPHIL # BLD AUTO: 0 K/UL (ref 0–0.5)
EOSINOPHIL # BLD AUTO: 0 K/UL (ref 0–0.5)
EOSINOPHIL # BLD AUTO: 0.1 K/UL (ref 0–0.5)
EOSINOPHIL # BLD AUTO: 0.2 K/UL (ref 0–0.5)
EOSINOPHIL NFR BLD: 0.1 % (ref 0–8)
EOSINOPHIL NFR BLD: 0.1 % (ref 0–8)
EOSINOPHIL NFR BLD: 0.8 % (ref 0–8)
EOSINOPHIL NFR BLD: 2.6 % (ref 0–8)
ERYTHROCYTE [DISTWIDTH] IN BLOOD BY AUTOMATED COUNT: 12.4 % (ref 11.5–14.5)
ERYTHROCYTE [DISTWIDTH] IN BLOOD BY AUTOMATED COUNT: 12.5 % (ref 11.5–14.5)
ERYTHROCYTE [DISTWIDTH] IN BLOOD BY AUTOMATED COUNT: 12.8 % (ref 11.5–14.5)
ERYTHROCYTE [DISTWIDTH] IN BLOOD BY AUTOMATED COUNT: 13.1 % (ref 11.5–14.5)
ERYTHROCYTE [SEDIMENTATION RATE] IN BLOOD BY WESTERGREN METHOD: 10 MM/H
ERYTHROCYTE [SEDIMENTATION RATE] IN BLOOD BY WESTERGREN METHOD: 16 MM/H
ERYTHROCYTE [SEDIMENTATION RATE] IN BLOOD BY WESTERGREN METHOD: 20 MM/H
ERYTHROCYTE [SEDIMENTATION RATE] IN BLOOD BY WESTERGREN METHOD: 24 MM/H
EST. GFR  (AFRICAN AMERICAN): >60 ML/MIN/1.73 M^2
EST. GFR  (NON AFRICAN AMERICAN): >60 ML/MIN/1.73 M^2
ETCO2: 37
FIO2: 100
FIO2: 32
FIO2: 45
FIO2: 50
FIO2: 60
FIO2: 60
FLOW: 3
GLUCOSE SERPL-MCNC: 103 MG/DL (ref 70–110)
GLUCOSE SERPL-MCNC: 103 MG/DL (ref 70–110)
GLUCOSE SERPL-MCNC: 104 MG/DL (ref 70–110)
GLUCOSE SERPL-MCNC: 105 MG/DL (ref 70–110)
GLUCOSE SERPL-MCNC: 106 MG/DL (ref 70–110)
GLUCOSE SERPL-MCNC: 106 MG/DL (ref 70–110)
GLUCOSE SERPL-MCNC: 107 MG/DL (ref 70–110)
GLUCOSE SERPL-MCNC: 109 MG/DL (ref 70–110)
GLUCOSE SERPL-MCNC: 109 MG/DL (ref 70–110)
GLUCOSE SERPL-MCNC: 119 MG/DL (ref 70–110)
GLUCOSE SERPL-MCNC: 123 MG/DL (ref 70–110)
GLUCOSE SERPL-MCNC: 127 MG/DL (ref 70–110)
GLUCOSE SERPL-MCNC: 131 MG/DL (ref 70–110)
GLUCOSE SERPL-MCNC: 137 MG/DL (ref 70–110)
GLUCOSE SERPL-MCNC: 138 MG/DL (ref 70–110)
GLUCOSE SERPL-MCNC: 89 MG/DL (ref 70–110)
GLUCOSE SERPL-MCNC: 92 MG/DL (ref 70–110)
HCO3 UR-SCNC: 19.7 MMOL/L (ref 24–28)
HCO3 UR-SCNC: 19.9 MMOL/L (ref 24–28)
HCO3 UR-SCNC: 21.9 MMOL/L (ref 24–28)
HCO3 UR-SCNC: 22.7 MMOL/L (ref 24–28)
HCO3 UR-SCNC: 22.7 MMOL/L (ref 24–28)
HCO3 UR-SCNC: 23 MMOL/L (ref 24–28)
HCO3 UR-SCNC: 23.5 MMOL/L (ref 24–28)
HCO3 UR-SCNC: 23.7 MMOL/L (ref 24–28)
HCO3 UR-SCNC: 25.9 MMOL/L (ref 24–28)
HCO3 UR-SCNC: 26.7 MMOL/L (ref 24–28)
HCT VFR BLD AUTO: 25.6 % (ref 40–54)
HCT VFR BLD AUTO: 26.3 % (ref 40–54)
HCT VFR BLD AUTO: 27.6 % (ref 40–54)
HCT VFR BLD AUTO: 41 % (ref 40–54)
HCT VFR BLD CALC: 23 %PCV (ref 36–54)
HCT VFR BLD CALC: 25 %PCV (ref 36–54)
HCT VFR BLD CALC: 26 %PCV (ref 36–54)
HCT VFR BLD CALC: 26 %PCV (ref 36–54)
HCT VFR BLD CALC: 39 %PCV (ref 36–54)
HGB BLD-MCNC: 14.1 G/DL (ref 14–18)
HGB BLD-MCNC: 8.9 G/DL (ref 14–18)
HGB BLD-MCNC: 9 G/DL (ref 14–18)
HGB BLD-MCNC: 9.5 G/DL (ref 14–18)
IMM GRANULOCYTES # BLD AUTO: 0.02 K/UL (ref 0–0.04)
IMM GRANULOCYTES # BLD AUTO: 0.05 K/UL (ref 0–0.04)
IMM GRANULOCYTES # BLD AUTO: 0.05 K/UL (ref 0–0.04)
IMM GRANULOCYTES # BLD AUTO: 0.07 K/UL (ref 0–0.04)
IMM GRANULOCYTES NFR BLD AUTO: 0.3 % (ref 0–0.5)
IMM GRANULOCYTES NFR BLD AUTO: 0.4 % (ref 0–0.5)
IMM GRANULOCYTES NFR BLD AUTO: 0.4 % (ref 0–0.5)
IMM GRANULOCYTES NFR BLD AUTO: 0.5 % (ref 0–0.5)
INR PPP: 1.2
LYMPHOCYTES # BLD AUTO: 1.4 K/UL (ref 1–4.8)
LYMPHOCYTES # BLD AUTO: 1.7 K/UL (ref 1–4.8)
LYMPHOCYTES # BLD AUTO: 2.2 K/UL (ref 1–4.8)
LYMPHOCYTES # BLD AUTO: 2.5 K/UL (ref 1–4.8)
LYMPHOCYTES NFR BLD: 11.5 % (ref 18–48)
LYMPHOCYTES NFR BLD: 12.1 % (ref 18–48)
LYMPHOCYTES NFR BLD: 18.1 % (ref 18–48)
LYMPHOCYTES NFR BLD: 31.5 % (ref 18–48)
MAGNESIUM SERPL-MCNC: 1.9 MG/DL (ref 1.6–2.6)
MAGNESIUM SERPL-MCNC: 2 MG/DL (ref 1.6–2.6)
MAGNESIUM SERPL-MCNC: 2 MG/DL (ref 1.6–2.6)
MAGNESIUM SERPL-MCNC: 2.5 MG/DL (ref 1.6–2.6)
MCH RBC QN AUTO: 30.5 PG (ref 27–31)
MCH RBC QN AUTO: 31.3 PG (ref 27–31)
MCH RBC QN AUTO: 31.5 PG (ref 27–31)
MCH RBC QN AUTO: 32.4 PG (ref 27–31)
MCHC RBC AUTO-ENTMCNC: 34.2 G/DL (ref 32–36)
MCHC RBC AUTO-ENTMCNC: 34.4 G/DL (ref 32–36)
MCHC RBC AUTO-ENTMCNC: 34.4 G/DL (ref 32–36)
MCHC RBC AUTO-ENTMCNC: 34.8 G/DL (ref 32–36)
MCV RBC AUTO: 89 FL (ref 82–98)
MCV RBC AUTO: 91 FL (ref 82–98)
MCV RBC AUTO: 91 FL (ref 82–98)
MCV RBC AUTO: 93 FL (ref 82–98)
MODE: ABNORMAL
MONOCYTES # BLD AUTO: 0.6 K/UL (ref 0.3–1)
MONOCYTES # BLD AUTO: 0.7 K/UL (ref 0.3–1)
MONOCYTES # BLD AUTO: 1.1 K/UL (ref 0.3–1)
MONOCYTES # BLD AUTO: 1.1 K/UL (ref 0.3–1)
MONOCYTES NFR BLD: 4.9 % (ref 4–15)
MONOCYTES NFR BLD: 8 % (ref 4–15)
MONOCYTES NFR BLD: 8.7 % (ref 4–15)
MONOCYTES NFR BLD: 9.1 % (ref 4–15)
NEUTROPHILS # BLD AUTO: 10.2 K/UL (ref 1.8–7.7)
NEUTROPHILS # BLD AUTO: 10.8 K/UL (ref 1.8–7.7)
NEUTROPHILS # BLD AUTO: 3.8 K/UL (ref 1.8–7.7)
NEUTROPHILS # BLD AUTO: 9.8 K/UL (ref 1.8–7.7)
NEUTROPHILS NFR BLD: 56.2 % (ref 38–73)
NEUTROPHILS NFR BLD: 75.7 % (ref 38–73)
NEUTROPHILS NFR BLD: 79.1 % (ref 38–73)
NEUTROPHILS NFR BLD: 79.2 % (ref 38–73)
NRBC BLD-RTO: 0 /100 WBC
PCO2 BLDA: 36.3 MMHG (ref 35–45)
PCO2 BLDA: 36.4 MMHG (ref 35–45)
PCO2 BLDA: 38.5 MMHG (ref 35–45)
PCO2 BLDA: 39 MMHG (ref 35–45)
PCO2 BLDA: 39.9 MMHG (ref 35–45)
PCO2 BLDA: 42.2 MMHG (ref 35–45)
PCO2 BLDA: 42.5 MMHG (ref 35–45)
PCO2 BLDA: 43.5 MMHG (ref 35–45)
PCO2 BLDA: 45.7 MMHG (ref 35–45)
PCO2 BLDA: 48.8 MMHG (ref 35–45)
PEEP: 5
PH SMN: 7.24 [PH] (ref 7.35–7.45)
PH SMN: 7.28 [PH] (ref 7.35–7.45)
PH SMN: 7.28 [PH] (ref 7.35–7.45)
PH SMN: 7.33 [PH] (ref 7.35–7.45)
PH SMN: 7.36 [PH] (ref 7.35–7.45)
PH SMN: 7.38 [PH] (ref 7.35–7.45)
PH SMN: 7.39 [PH] (ref 7.35–7.45)
PH SMN: 7.4 [PH] (ref 7.35–7.45)
PH SMN: 7.41 [PH] (ref 7.35–7.45)
PH SMN: 7.46 [PH] (ref 7.35–7.45)
PHOSPHATE SERPL-MCNC: 2.5 MG/DL (ref 2.7–4.5)
PLATELET # BLD AUTO: 101 K/UL (ref 150–350)
PLATELET # BLD AUTO: 105 K/UL (ref 150–350)
PLATELET # BLD AUTO: 109 K/UL (ref 150–350)
PLATELET # BLD AUTO: 163 K/UL (ref 150–350)
PLATELET BLD QL SMEAR: ABNORMAL
PMV BLD AUTO: 10.6 FL (ref 9.2–12.9)
PMV BLD AUTO: 9.2 FL (ref 9.2–12.9)
PMV BLD AUTO: 9.4 FL (ref 9.2–12.9)
PMV BLD AUTO: 9.7 FL (ref 9.2–12.9)
PO2 BLDA: 106 MMHG (ref 80–100)
PO2 BLDA: 119 MMHG (ref 80–100)
PO2 BLDA: 186 MMHG (ref 80–100)
PO2 BLDA: 301 MMHG (ref 80–100)
PO2 BLDA: 339 MMHG (ref 80–100)
PO2 BLDA: 414 MMHG (ref 80–100)
PO2 BLDA: 422 MMHG (ref 80–100)
PO2 BLDA: 716 MMHG (ref 80–100)
PO2 BLDA: 72 MMHG (ref 80–100)
PO2 BLDA: 83 MMHG (ref 80–100)
POC ACTIVATED CLOTTING TIME K: 109 SEC (ref 74–137)
POC ACTIVATED CLOTTING TIME K: 114 SEC (ref 74–137)
POC ACTIVATED CLOTTING TIME K: 577 SEC (ref 74–137)
POC ACTIVATED CLOTTING TIME K: 588 SEC (ref 74–137)
POC ACTIVATED CLOTTING TIME K: 670 SEC (ref 74–137)
POC BE: -1 MMOL/L
POC BE: -2 MMOL/L
POC BE: -2 MMOL/L
POC BE: -3 MMOL/L
POC BE: -4 MMOL/L
POC BE: -4 MMOL/L
POC BE: -7 MMOL/L
POC BE: -8 MMOL/L
POC BE: 2 MMOL/L
POC BE: 2 MMOL/L
POC IONIZED CALCIUM: 1 MMOL/L (ref 1.06–1.42)
POC IONIZED CALCIUM: 1.06 MMOL/L (ref 1.06–1.42)
POC IONIZED CALCIUM: 1.16 MMOL/L (ref 1.06–1.42)
POC IONIZED CALCIUM: 1.18 MMOL/L (ref 1.06–1.42)
POC IONIZED CALCIUM: 1.19 MMOL/L (ref 1.06–1.42)
POC IONIZED CALCIUM: 1.21 MMOL/L (ref 1.06–1.42)
POC IONIZED CALCIUM: 1.27 MMOL/L (ref 1.06–1.42)
POC PCO2 TEMP: 39.4 MMHG
POC PCO2 TEMP: 42.2 MMHG
POC PCO2 TEMP: 43.5 MMHG
POC PCO2 TEMP: 48.8 MMHG
POC PH TEMP: 7.28
POC PH TEMP: 7.28
POC PH TEMP: 7.33
POC PH TEMP: 7.38
POC PO2 TEMP: 106 MMHG
POC PO2 TEMP: 131 MMHG
POC PO2 TEMP: 186 MMHG
POC PO2 TEMP: 301 MMHG
POC SATURATED O2: 100 % (ref 95–100)
POC SATURATED O2: 94 % (ref 95–100)
POC SATURATED O2: 94 % (ref 95–100)
POC SATURATED O2: 98 % (ref 95–100)
POC SATURATED O2: 99 % (ref 95–100)
POC SATURATED O2: 99 % (ref 95–100)
POC TCO2: 21 MMOL/L (ref 23–27)
POC TCO2: 21 MMOL/L (ref 23–27)
POC TCO2: 23 MMOL/L (ref 23–27)
POC TCO2: 24 MMOL/L (ref 23–27)
POC TCO2: 25 MMOL/L (ref 23–27)
POC TCO2: 25 MMOL/L (ref 23–27)
POC TCO2: 27 MMOL/L (ref 23–27)
POC TCO2: 28 MMOL/L (ref 23–27)
POC TEMPERATURE: ABNORMAL
POTASSIUM BLD-SCNC: 3.9 MMOL/L (ref 3.5–5.1)
POTASSIUM BLD-SCNC: 4.1 MMOL/L (ref 3.5–5.1)
POTASSIUM BLD-SCNC: 4.3 MMOL/L (ref 3.5–5.1)
POTASSIUM BLD-SCNC: 4.4 MMOL/L (ref 3.5–5.1)
POTASSIUM BLD-SCNC: 4.4 MMOL/L (ref 3.5–5.1)
POTASSIUM BLD-SCNC: 4.6 MMOL/L (ref 3.5–5.1)
POTASSIUM BLD-SCNC: 4.8 MMOL/L (ref 3.5–5.1)
POTASSIUM SERPL-SCNC: 3.6 MMOL/L (ref 3.5–5.1)
POTASSIUM SERPL-SCNC: 3.9 MMOL/L (ref 3.5–5.1)
POTASSIUM SERPL-SCNC: 3.9 MMOL/L (ref 3.5–5.1)
POTASSIUM SERPL-SCNC: 4.2 MMOL/L (ref 3.5–5.1)
POTASSIUM SERPL-SCNC: 4.5 MMOL/L (ref 3.5–5.1)
PROT SERPL-MCNC: 6.6 G/DL (ref 6–8.4)
PROTHROMBIN TIME: 14.2 SEC (ref 10.6–14.8)
PS: 10
PS: 5
RBC # BLD AUTO: 2.75 M/UL (ref 4.6–6.2)
RBC # BLD AUTO: 2.88 M/UL (ref 4.6–6.2)
RBC # BLD AUTO: 3.02 M/UL (ref 4.6–6.2)
RBC # BLD AUTO: 4.63 M/UL (ref 4.6–6.2)
SAMPLE: ABNORMAL
SAMPLE: NORMAL
SAMPLE: NORMAL
SITE: ABNORMAL
SODIUM BLD-SCNC: 141 MMOL/L (ref 136–145)
SODIUM BLD-SCNC: 142 MMOL/L (ref 136–145)
SODIUM BLD-SCNC: 145 MMOL/L (ref 136–145)
SODIUM BLD-SCNC: 146 MMOL/L (ref 136–145)
SODIUM BLD-SCNC: 147 MMOL/L (ref 136–145)
SODIUM SERPL-SCNC: 140 MMOL/L (ref 136–145)
SODIUM SERPL-SCNC: 141 MMOL/L (ref 136–145)
SODIUM SERPL-SCNC: 143 MMOL/L (ref 136–145)
SODIUM SERPL-SCNC: 143 MMOL/L (ref 136–145)
SP02: 98
SPONT RATE: 18
SPONT RATE: 21
VT: 500
WBC # BLD AUTO: 12.36 K/UL (ref 3.9–12.7)
WBC # BLD AUTO: 13.52 K/UL (ref 3.9–12.7)
WBC # BLD AUTO: 13.68 K/UL (ref 3.9–12.7)
WBC # BLD AUTO: 6.83 K/UL (ref 3.9–12.7)

## 2020-09-11 PROCEDURE — 63600175 PHARM REV CODE 636 W HCPCS

## 2020-09-11 PROCEDURE — 25000003 PHARM REV CODE 250: Performed by: THORACIC SURGERY (CARDIOTHORACIC VASCULAR SURGERY)

## 2020-09-11 PROCEDURE — 85610 PROTHROMBIN TIME: CPT

## 2020-09-11 PROCEDURE — S0017 INJECTION, AMINOCAPROIC ACID: HCPCS

## 2020-09-11 PROCEDURE — 33519 CABG ARTERY-VEIN THREE: CPT | Mod: ,,, | Performed by: THORACIC SURGERY (CARDIOTHORACIC VASCULAR SURGERY)

## 2020-09-11 PROCEDURE — C1751 CATH, INF, PER/CENT/MIDLINE: HCPCS | Performed by: ANESTHESIOLOGY

## 2020-09-11 PROCEDURE — 82330 ASSAY OF CALCIUM: CPT

## 2020-09-11 PROCEDURE — 93005 ELECTROCARDIOGRAM TRACING: CPT | Performed by: INTERNAL MEDICINE

## 2020-09-11 PROCEDURE — 27202276 HC INTRODUCER, PERC 8 FR: Performed by: ANESTHESIOLOGY

## 2020-09-11 PROCEDURE — 27000652 HC HIGH FLOW IV SET: Performed by: ANESTHESIOLOGY

## 2020-09-11 PROCEDURE — 99900035 HC TECH TIME PER 15 MIN (STAT)

## 2020-09-11 PROCEDURE — 99900026 HC AIRWAY MAINTENANCE (STAT)

## 2020-09-11 PROCEDURE — 20000000 HC ICU ROOM

## 2020-09-11 PROCEDURE — S0028 INJECTION, FAMOTIDINE, 20 MG: HCPCS | Performed by: NURSE ANESTHETIST, CERTIFIED REGISTERED

## 2020-09-11 PROCEDURE — 84295 ASSAY OF SERUM SODIUM: CPT

## 2020-09-11 PROCEDURE — 37000008 HC ANESTHESIA 1ST 15 MINUTES: Performed by: THORACIC SURGERY (CARDIOTHORACIC VASCULAR SURGERY)

## 2020-09-11 PROCEDURE — 27200678 HC TRANSDUCER MONITOR KIT TRIPLE: Performed by: ANESTHESIOLOGY

## 2020-09-11 PROCEDURE — 27202163 HC CATH MULTI LUMEN: Performed by: ANESTHESIOLOGY

## 2020-09-11 PROCEDURE — 83735 ASSAY OF MAGNESIUM: CPT

## 2020-09-11 PROCEDURE — 63600175 PHARM REV CODE 636 W HCPCS: Performed by: THORACIC SURGERY (CARDIOTHORACIC VASCULAR SURGERY)

## 2020-09-11 PROCEDURE — 36000713 HC OR TIME LEV V EA ADD 15 MIN: Performed by: THORACIC SURGERY (CARDIOTHORACIC VASCULAR SURGERY)

## 2020-09-11 PROCEDURE — P9047 ALBUMIN (HUMAN), 25%, 50ML: HCPCS | Mod: JG

## 2020-09-11 PROCEDURE — 80053 COMPREHEN METABOLIC PANEL: CPT

## 2020-09-11 PROCEDURE — 37000009 HC ANESTHESIA EA ADD 15 MINS: Performed by: THORACIC SURGERY (CARDIOTHORACIC VASCULAR SURGERY)

## 2020-09-11 PROCEDURE — 82803 BLOOD GASES ANY COMBINATION: CPT

## 2020-09-11 PROCEDURE — 84132 ASSAY OF SERUM POTASSIUM: CPT

## 2020-09-11 PROCEDURE — P9045 ALBUMIN (HUMAN), 5%, 250 ML: HCPCS | Mod: JG | Performed by: THORACIC SURGERY (CARDIOTHORACIC VASCULAR SURGERY)

## 2020-09-11 PROCEDURE — 37799 UNLISTED PX VASCULAR SURGERY: CPT

## 2020-09-11 PROCEDURE — 33508 PR ENDOSCOPY W/VIDEO-ASST VEIN HARVEST,CABG: ICD-10-PCS | Mod: ,,, | Performed by: THORACIC SURGERY (CARDIOTHORACIC VASCULAR SURGERY)

## 2020-09-11 PROCEDURE — 93010 ELECTROCARDIOGRAM REPORT: CPT | Mod: 76,,, | Performed by: INTERNAL MEDICINE

## 2020-09-11 PROCEDURE — 27100025 HC TUBING, SET FLUID WARMER: Performed by: ANESTHESIOLOGY

## 2020-09-11 PROCEDURE — 88305 TISSUE EXAM BY PATHOLOGIST: CPT | Mod: TC

## 2020-09-11 PROCEDURE — 85014 HEMATOCRIT: CPT

## 2020-09-11 PROCEDURE — 25000003 PHARM REV CODE 250: Performed by: STUDENT IN AN ORGANIZED HEALTH CARE EDUCATION/TRAINING PROGRAM

## 2020-09-11 PROCEDURE — 27000666 HC INFUSOR PRESSURE BAG: Performed by: ANESTHESIOLOGY

## 2020-09-11 PROCEDURE — 94761 N-INVAS EAR/PLS OXIMETRY MLT: CPT

## 2020-09-11 PROCEDURE — 27000658 HC ARTERIAL LINE ALL: Performed by: ANESTHESIOLOGY

## 2020-09-11 PROCEDURE — 33508 ENDOSCOPIC VEIN HARVEST: CPT | Mod: ,,, | Performed by: THORACIC SURGERY (CARDIOTHORACIC VASCULAR SURGERY)

## 2020-09-11 PROCEDURE — 63600175 PHARM REV CODE 636 W HCPCS: Performed by: INTERNAL MEDICINE

## 2020-09-11 PROCEDURE — 27201107 HC STYLET, STANDARD: Performed by: ANESTHESIOLOGY

## 2020-09-11 PROCEDURE — 94002 VENT MGMT INPAT INIT DAY: CPT

## 2020-09-11 PROCEDURE — 83735 ASSAY OF MAGNESIUM: CPT | Mod: 91

## 2020-09-11 PROCEDURE — S0017 INJECTION, AMINOCAPROIC ACID: HCPCS | Performed by: NURSE ANESTHETIST, CERTIFIED REGISTERED

## 2020-09-11 PROCEDURE — 94010 BREATHING CAPACITY TEST: CPT

## 2020-09-11 PROCEDURE — 36000712 HC OR TIME LEV V 1ST 15 MIN: Performed by: THORACIC SURGERY (CARDIOTHORACIC VASCULAR SURGERY)

## 2020-09-11 PROCEDURE — 27000675 HC TUBING MICRODRIP: Performed by: ANESTHESIOLOGY

## 2020-09-11 PROCEDURE — 93010 EKG 12-LEAD: ICD-10-PCS | Mod: 76,,, | Performed by: INTERNAL MEDICINE

## 2020-09-11 PROCEDURE — 25000003 PHARM REV CODE 250: Performed by: INTERNAL MEDICINE

## 2020-09-11 PROCEDURE — 27000221 HC OXYGEN, UP TO 24 HOURS

## 2020-09-11 PROCEDURE — 85025 COMPLETE CBC W/AUTO DIFF WBC: CPT | Mod: 91

## 2020-09-11 PROCEDURE — 33533 CABG ARTERIAL SINGLE: CPT | Mod: ,,, | Performed by: THORACIC SURGERY (CARDIOTHORACIC VASCULAR SURGERY)

## 2020-09-11 PROCEDURE — 94799 UNLISTED PULMONARY SVC/PX: CPT

## 2020-09-11 PROCEDURE — C1729 CATH, DRAINAGE: HCPCS | Performed by: THORACIC SURGERY (CARDIOTHORACIC VASCULAR SURGERY)

## 2020-09-11 PROCEDURE — 25000003 PHARM REV CODE 250

## 2020-09-11 PROCEDURE — 33999 PR RESECTION OF LAA: ICD-10-PCS | Mod: ,,, | Performed by: THORACIC SURGERY (CARDIOTHORACIC VASCULAR SURGERY)

## 2020-09-11 PROCEDURE — 33999 UNLISTED PX CARDIAC SURGERY: CPT | Mod: ,,, | Performed by: THORACIC SURGERY (CARDIOTHORACIC VASCULAR SURGERY)

## 2020-09-11 PROCEDURE — 93010 ELECTROCARDIOGRAM REPORT: CPT | Mod: ,,, | Performed by: INTERNAL MEDICINE

## 2020-09-11 PROCEDURE — 27201423 OPTIME MED/SURG SUP & DEVICES STERILE SUPPLY: Performed by: THORACIC SURGERY (CARDIOTHORACIC VASCULAR SURGERY)

## 2020-09-11 PROCEDURE — C9248 INJ, CLEVIDIPINE BUTYRATE: HCPCS | Performed by: NURSE ANESTHETIST, CERTIFIED REGISTERED

## 2020-09-11 PROCEDURE — 85730 THROMBOPLASTIN TIME PARTIAL: CPT

## 2020-09-11 PROCEDURE — 27000673 HC TUBING BLOOD Y: Performed by: ANESTHESIOLOGY

## 2020-09-11 PROCEDURE — 27000656 HC EYE GOGGLES: Performed by: ANESTHESIOLOGY

## 2020-09-11 PROCEDURE — P9045 ALBUMIN (HUMAN), 5%, 250 ML: HCPCS | Mod: JG | Performed by: NURSE ANESTHETIST, CERTIFIED REGISTERED

## 2020-09-11 PROCEDURE — 27100019 HC AMBU BAG ADULT/PED: Performed by: ANESTHESIOLOGY

## 2020-09-11 PROCEDURE — 82962 GLUCOSE BLOOD TEST: CPT

## 2020-09-11 PROCEDURE — 27000080 OPTIME MED/SURG SUP & DEVICES GENERAL CLASSIFICATION: Performed by: THORACIC SURGERY (CARDIOTHORACIC VASCULAR SURGERY)

## 2020-09-11 PROCEDURE — 80048 BASIC METABOLIC PNL TOTAL CA: CPT

## 2020-09-11 PROCEDURE — 27000676 HC TUBING PRIMARY PLUMSET: Performed by: ANESTHESIOLOGY

## 2020-09-11 PROCEDURE — 27000683 HC PILLOW THERAPEUTIC

## 2020-09-11 PROCEDURE — 25000003 PHARM REV CODE 250: Performed by: NURSE ANESTHETIST, CERTIFIED REGISTERED

## 2020-09-11 PROCEDURE — 63600175 PHARM REV CODE 636 W HCPCS: Mod: JG | Performed by: NURSE ANESTHETIST, CERTIFIED REGISTERED

## 2020-09-11 PROCEDURE — 36415 COLL VENOUS BLD VENIPUNCTURE: CPT

## 2020-09-11 PROCEDURE — 27202107 HC XP QUATRO SENSOR: Performed by: ANESTHESIOLOGY

## 2020-09-11 PROCEDURE — 33519 PR CABG, ARTERY-VEIN, THREE: ICD-10-PCS | Mod: ,,, | Performed by: THORACIC SURGERY (CARDIOTHORACIC VASCULAR SURGERY)

## 2020-09-11 PROCEDURE — 33533 PR CABG, ARTERIAL, SINGLE: ICD-10-PCS | Mod: ,,, | Performed by: THORACIC SURGERY (CARDIOTHORACIC VASCULAR SURGERY)

## 2020-09-11 PROCEDURE — 84100 ASSAY OF PHOSPHORUS: CPT

## 2020-09-11 PROCEDURE — 27000671 HC TUBING MICROBORE EXT: Performed by: ANESTHESIOLOGY

## 2020-09-11 DEVICE — PLEDGET FELT BARD 4.8MM X 9.5MM 007963: Type: IMPLANTABLE DEVICE | Site: HEART | Status: FUNCTIONAL

## 2020-09-11 RX ORDER — MAGNESIUM SULFATE HEPTAHYDRATE 40 MG/ML
2 INJECTION, SOLUTION INTRAVENOUS
Status: DISCONTINUED | OUTPATIENT
Start: 2020-09-11 | End: 2020-09-17 | Stop reason: HOSPADM

## 2020-09-11 RX ORDER — SODIUM BICARBONATE 1 MEQ/ML
50 SYRINGE (ML) INTRAVENOUS ONCE
Status: COMPLETED | OUTPATIENT
Start: 2020-09-11 | End: 2020-09-11

## 2020-09-11 RX ORDER — VANCOMYCIN HYDROCHLORIDE 500 MG/10ML
INJECTION, POWDER, LYOPHILIZED, FOR SOLUTION INTRAVENOUS
Status: DISCONTINUED | OUTPATIENT
Start: 2020-09-11 | End: 2020-09-11

## 2020-09-11 RX ORDER — ESMOLOL HYDROCHLORIDE 10 MG/ML
INJECTION INTRAVENOUS
Status: DISCONTINUED | OUTPATIENT
Start: 2020-09-11 | End: 2020-09-11

## 2020-09-11 RX ORDER — DOCUSATE SODIUM 100 MG/1
100 CAPSULE, LIQUID FILLED ORAL 2 TIMES DAILY
Status: DISCONTINUED | OUTPATIENT
Start: 2020-09-12 | End: 2020-09-17 | Stop reason: HOSPADM

## 2020-09-11 RX ORDER — ROCURONIUM BROMIDE 10 MG/ML
INJECTION, SOLUTION INTRAVENOUS
Status: DISCONTINUED | OUTPATIENT
Start: 2020-09-11 | End: 2020-09-11

## 2020-09-11 RX ORDER — POTASSIUM CHLORIDE 29.8 MG/ML
20 INJECTION INTRAVENOUS
Status: DISCONTINUED | OUTPATIENT
Start: 2020-09-11 | End: 2020-09-15

## 2020-09-11 RX ORDER — PROTAMINE SULFATE 10 MG/ML
INJECTION, SOLUTION INTRAVENOUS
Status: DISCONTINUED | OUTPATIENT
Start: 2020-09-11 | End: 2020-09-11

## 2020-09-11 RX ORDER — LIDOCAINE HYDROCHLORIDE 20 MG/ML
INJECTION, SOLUTION EPIDURAL; INFILTRATION; INTRACAUDAL; PERINEURAL
Status: DISCONTINUED | OUTPATIENT
Start: 2020-09-11 | End: 2020-09-11

## 2020-09-11 RX ORDER — SODIUM BICARBONATE 1 MEQ/ML
SYRINGE (ML) INTRAVENOUS
Status: COMPLETED
Start: 2020-09-11 | End: 2020-09-11

## 2020-09-11 RX ORDER — SODIUM CHLORIDE, SODIUM LACTATE, POTASSIUM CHLORIDE, CALCIUM CHLORIDE 600; 310; 30; 20 MG/100ML; MG/100ML; MG/100ML; MG/100ML
INJECTION, SOLUTION INTRAVENOUS CONTINUOUS PRN
Status: DISCONTINUED | OUTPATIENT
Start: 2020-09-11 | End: 2020-09-11

## 2020-09-11 RX ORDER — CEFAZOLIN SODIUM 1 G/3ML
INJECTION, POWDER, FOR SOLUTION INTRAMUSCULAR; INTRAVENOUS
Status: DISCONTINUED | OUTPATIENT
Start: 2020-09-11 | End: 2020-09-11

## 2020-09-11 RX ORDER — ACETAMINOPHEN 325 MG/1
650 TABLET ORAL EVERY 6 HOURS PRN
Status: DISCONTINUED | OUTPATIENT
Start: 2020-09-11 | End: 2020-09-17 | Stop reason: HOSPADM

## 2020-09-11 RX ORDER — MORPHINE SULFATE 2 MG/ML
2 INJECTION, SOLUTION INTRAMUSCULAR; INTRAVENOUS
Status: DISCONTINUED | OUTPATIENT
Start: 2020-09-11 | End: 2020-09-13

## 2020-09-11 RX ORDER — ALBUMIN HUMAN 50 G/1000ML
SOLUTION INTRAVENOUS CONTINUOUS PRN
Status: DISCONTINUED | OUTPATIENT
Start: 2020-09-11 | End: 2020-09-11

## 2020-09-11 RX ORDER — MORPHINE SULFATE 4 MG/ML
4 INJECTION, SOLUTION INTRAMUSCULAR; INTRAVENOUS
Status: DISCONTINUED | OUTPATIENT
Start: 2020-09-11 | End: 2020-09-13

## 2020-09-11 RX ORDER — SODIUM CHLORIDE 450 MG/100ML
INJECTION, SOLUTION INTRAVENOUS CONTINUOUS
Status: DISCONTINUED | OUTPATIENT
Start: 2020-09-11 | End: 2020-09-12

## 2020-09-11 RX ORDER — PHENYLEPHRINE HYDROCHLORIDE 10 MG/ML
INJECTION INTRAVENOUS
Status: DISCONTINUED | OUTPATIENT
Start: 2020-09-11 | End: 2020-09-11

## 2020-09-11 RX ORDER — POTASSIUM CHLORIDE 14.9 MG/ML
20 INJECTION INTRAVENOUS
Status: DISCONTINUED | OUTPATIENT
Start: 2020-09-11 | End: 2020-09-15

## 2020-09-11 RX ORDER — SODIUM BICARBONATE 1 MEQ/ML
100 SYRINGE (ML) INTRAVENOUS ONCE
Status: COMPLETED | OUTPATIENT
Start: 2020-09-11 | End: 2020-09-11

## 2020-09-11 RX ORDER — ONDANSETRON 2 MG/ML
4 INJECTION INTRAMUSCULAR; INTRAVENOUS EVERY 6 HOURS PRN
Status: DISCONTINUED | OUTPATIENT
Start: 2020-09-11 | End: 2020-09-17 | Stop reason: HOSPADM

## 2020-09-11 RX ORDER — LIDOCAINE HYDROCHLORIDE 20 MG/ML
JELLY TOPICAL
Status: DISCONTINUED | OUTPATIENT
Start: 2020-09-11 | End: 2020-09-11

## 2020-09-11 RX ORDER — CHLORHEXIDINE GLUCONATE ORAL RINSE 1.2 MG/ML
15 SOLUTION DENTAL 2 TIMES DAILY
Status: DISPENSED | OUTPATIENT
Start: 2020-09-11 | End: 2020-09-16

## 2020-09-11 RX ORDER — VECURONIUM BROMIDE FOR INJECTION 1 MG/ML
INJECTION, POWDER, LYOPHILIZED, FOR SOLUTION INTRAVENOUS
Status: DISCONTINUED | OUTPATIENT
Start: 2020-09-11 | End: 2020-09-11

## 2020-09-11 RX ORDER — FAMOTIDINE 10 MG/ML
INJECTION INTRAVENOUS
Status: DISCONTINUED | OUTPATIENT
Start: 2020-09-11 | End: 2020-09-11

## 2020-09-11 RX ORDER — MIDAZOLAM HYDROCHLORIDE 1 MG/ML
1 INJECTION INTRAMUSCULAR; INTRAVENOUS
Status: DISCONTINUED | OUTPATIENT
Start: 2020-09-11 | End: 2020-09-12

## 2020-09-11 RX ORDER — MUPIROCIN 20 MG/G
OINTMENT TOPICAL 2 TIMES DAILY
Status: DISPENSED | OUTPATIENT
Start: 2020-09-11 | End: 2020-09-16

## 2020-09-11 RX ORDER — SODIUM CHLORIDE 9 MG/ML
INJECTION, SOLUTION INTRAVENOUS CONTINUOUS PRN
Status: DISCONTINUED | OUTPATIENT
Start: 2020-09-11 | End: 2020-09-11

## 2020-09-11 RX ORDER — HYDROCODONE BITARTRATE AND ACETAMINOPHEN 5; 325 MG/1; MG/1
1 TABLET ORAL EVERY 4 HOURS PRN
Status: DISCONTINUED | OUTPATIENT
Start: 2020-09-11 | End: 2020-09-17 | Stop reason: HOSPADM

## 2020-09-11 RX ORDER — FENTANYL CITRATE 50 UG/ML
INJECTION, SOLUTION INTRAMUSCULAR; INTRAVENOUS
Status: DISCONTINUED | OUTPATIENT
Start: 2020-09-11 | End: 2020-09-11

## 2020-09-11 RX ORDER — ETOMIDATE 2 MG/ML
INJECTION INTRAVENOUS
Status: DISCONTINUED | OUTPATIENT
Start: 2020-09-11 | End: 2020-09-11

## 2020-09-11 RX ORDER — HEPARIN SODIUM 5000 [USP'U]/ML
INJECTION, SOLUTION INTRAVENOUS; SUBCUTANEOUS
Status: DISCONTINUED | OUTPATIENT
Start: 2020-09-11 | End: 2020-09-11

## 2020-09-11 RX ORDER — ALBUMIN HUMAN 50 G/1000ML
500 SOLUTION INTRAVENOUS
Status: DISCONTINUED | OUTPATIENT
Start: 2020-09-11 | End: 2020-09-17 | Stop reason: HOSPADM

## 2020-09-11 RX ORDER — SUCCINYLCHOLINE CHLORIDE 20 MG/ML
INJECTION INTRAMUSCULAR; INTRAVENOUS
Status: DISCONTINUED | OUTPATIENT
Start: 2020-09-11 | End: 2020-09-11

## 2020-09-11 RX ORDER — MAGNESIUM SULFATE HEPTAHYDRATE 40 MG/ML
4 INJECTION, SOLUTION INTRAVENOUS
Status: DISCONTINUED | OUTPATIENT
Start: 2020-09-11 | End: 2020-09-17 | Stop reason: HOSPADM

## 2020-09-11 RX ORDER — HEPARIN SODIUM 10000 [USP'U]/ML
INJECTION, SOLUTION INTRAVENOUS; SUBCUTANEOUS
Status: DISCONTINUED | OUTPATIENT
Start: 2020-09-11 | End: 2020-09-11

## 2020-09-11 RX ORDER — MIDAZOLAM HYDROCHLORIDE 1 MG/ML
INJECTION, SOLUTION INTRAMUSCULAR; INTRAVENOUS
Status: DISCONTINUED | OUTPATIENT
Start: 2020-09-11 | End: 2020-09-11

## 2020-09-11 RX ORDER — CEFAZOLIN SODIUM 2 G/50ML
2 SOLUTION INTRAVENOUS
Status: COMPLETED | OUTPATIENT
Start: 2020-09-11 | End: 2020-09-12

## 2020-09-11 RX ADMIN — PHENYLEPHRINE HYDROCHLORIDE 100 MCG: 10 INJECTION INTRAVENOUS at 10:09

## 2020-09-11 RX ADMIN — SODIUM BICARBONATE: 84 INJECTION, SOLUTION INTRAVENOUS at 07:09

## 2020-09-11 RX ADMIN — SODIUM BICARBONATE 100 MEQ: 84 INJECTION, SOLUTION INTRAVENOUS at 01:09

## 2020-09-11 RX ADMIN — FENTANYL CITRATE 250 MCG: 50 INJECTION INTRAMUSCULAR; INTRAVENOUS at 07:09

## 2020-09-11 RX ADMIN — FENTANYL CITRATE 250 MCG: 50 INJECTION INTRAMUSCULAR; INTRAVENOUS at 08:09

## 2020-09-11 RX ADMIN — FENTANYL CITRATE 250 MCG: 50 INJECTION INTRAMUSCULAR; INTRAVENOUS at 06:09

## 2020-09-11 RX ADMIN — MORPHINE SULFATE 2 MG: 2 INJECTION, SOLUTION INTRAMUSCULAR; INTRAVENOUS at 12:09

## 2020-09-11 RX ADMIN — POTASSIUM CHLORIDE 40 MEQ: 20 TABLET, EXTENDED RELEASE ORAL at 04:09

## 2020-09-11 RX ADMIN — GENTAMICIN SULFATE 80 MG: 40 INJECTION, SOLUTION INTRAMUSCULAR; INTRAVENOUS at 06:09

## 2020-09-11 RX ADMIN — FIBRINOGEN HUMAN, HUMAN THROMBIN: 90; 500 SOLUTION TOPICAL at 07:09

## 2020-09-11 RX ADMIN — SODIUM CHLORIDE: 0.9 INJECTION, SOLUTION INTRAVENOUS at 09:09

## 2020-09-11 RX ADMIN — VECURONIUM BROMIDE 10 MG: 1 INJECTION, POWDER, LYOPHILIZED, FOR SOLUTION INTRAVENOUS at 07:09

## 2020-09-11 RX ADMIN — FAMOTIDINE 20 MG: 10 INJECTION, SOLUTION INTRAVENOUS at 06:09

## 2020-09-11 RX ADMIN — MIDAZOLAM HYDROCHLORIDE 5 MG: 1 INJECTION INTRAMUSCULAR; INTRAVENOUS at 08:09

## 2020-09-11 RX ADMIN — ALBUMIN (HUMAN): 12.5 INJECTION, SOLUTION INTRAVENOUS at 10:09

## 2020-09-11 RX ADMIN — CEFAZOLIN SODIUM 2 G: 2 SOLUTION INTRAVENOUS at 09:09

## 2020-09-11 RX ADMIN — MUPIROCIN: 20 OINTMENT TOPICAL at 09:09

## 2020-09-11 RX ADMIN — MIDAZOLAM HYDROCHLORIDE 3 MG: 1 INJECTION INTRAMUSCULAR; INTRAVENOUS at 06:09

## 2020-09-11 RX ADMIN — CEFAZOLIN 2 G: 330 INJECTION, POWDER, FOR SOLUTION INTRAMUSCULAR; INTRAVENOUS at 06:09

## 2020-09-11 RX ADMIN — EPINEPHRINE 0.04 MCG/KG/MIN: 1 INJECTION INTRAMUSCULAR; INTRAVENOUS; SUBCUTANEOUS at 10:09

## 2020-09-11 RX ADMIN — FENTANYL CITRATE 500 MCG: 50 INJECTION INTRAMUSCULAR; INTRAVENOUS at 08:09

## 2020-09-11 RX ADMIN — SODIUM BICARBONATE 50 MEQ: 84 INJECTION, SOLUTION INTRAVENOUS at 01:09

## 2020-09-11 RX ADMIN — SODIUM CHLORIDE, SODIUM LACTATE, POTASSIUM CHLORIDE, AND CALCIUM CHLORIDE: .6; .31; .03; .02 INJECTION, SOLUTION INTRAVENOUS at 05:09

## 2020-09-11 RX ADMIN — SUCCINYLCHOLINE CHLORIDE 200 MG: 20 INJECTION, SOLUTION INTRAMUSCULAR; INTRAVENOUS at 06:09

## 2020-09-11 RX ADMIN — CLONIDINE HYDROCHLORIDE 0.2 MG: 0.1 TABLET ORAL at 03:09

## 2020-09-11 RX ADMIN — ROCURONIUM BROMIDE 40 MG: 10 INJECTION, SOLUTION INTRAVENOUS at 06:09

## 2020-09-11 RX ADMIN — MIDAZOLAM HYDROCHLORIDE 2 MG: 1 INJECTION INTRAMUSCULAR; INTRAVENOUS at 09:09

## 2020-09-11 RX ADMIN — SODIUM CHLORIDE: 0.9 INJECTION, SOLUTION INTRAVENOUS at 07:09

## 2020-09-11 RX ADMIN — POTASSIUM CHLORIDE 20 MEQ: 14.9 INJECTION, SOLUTION INTRAVENOUS at 01:09

## 2020-09-11 RX ADMIN — CEFAZOLIN SODIUM 2 G: 2 SOLUTION INTRAVENOUS at 03:09

## 2020-09-11 RX ADMIN — AMINOCAPROIC ACID 5 G: 250 INJECTION, SOLUTION INTRAVENOUS at 06:09

## 2020-09-11 RX ADMIN — LIDOCAINE HYDROCHLORIDE 5 ML: 20 JELLY TOPICAL at 06:09

## 2020-09-11 RX ADMIN — MORPHINE SULFATE 4 MG: 4 INJECTION INTRAVENOUS at 01:09

## 2020-09-11 RX ADMIN — LIDOCAINE HYDROCHLORIDE 100 MG: 20 INJECTION, SOLUTION INTRAVENOUS at 06:09

## 2020-09-11 RX ADMIN — HEPARIN SODIUM 35000 UNITS: 5000 INJECTION, SOLUTION INTRAVENOUS; SUBCUTANEOUS at 08:09

## 2020-09-11 RX ADMIN — SODIUM CHLORIDE: 0.45 INJECTION, SOLUTION INTRAVENOUS at 01:09

## 2020-09-11 RX ADMIN — PROTAMINE SULFATE 350 MG: 10 INJECTION, SOLUTION INTRAVENOUS at 10:09

## 2020-09-11 RX ADMIN — FIBRINOGEN HUMAN, HUMAN THROMBIN: 90; 500 SOLUTION TOPICAL at 06:09

## 2020-09-11 RX ADMIN — SODIUM CHLORIDE 1 UNITS/HR: 9 INJECTION, SOLUTION INTRAVENOUS at 07:09

## 2020-09-11 RX ADMIN — MIDAZOLAM HYDROCHLORIDE 2 MG: 1 INJECTION INTRAMUSCULAR; INTRAVENOUS at 07:09

## 2020-09-11 RX ADMIN — ACETAMINOPHEN 650 MG: 325 TABLET, FILM COATED ORAL at 04:09

## 2020-09-11 RX ADMIN — CLEVIPIDINE 4 MG/HR: 0.5 EMULSION INTRAVENOUS at 10:09

## 2020-09-11 RX ADMIN — FAMOTIDINE 20 MG: 20 TABLET ORAL at 09:09

## 2020-09-11 RX ADMIN — ONDANSETRON 4 MG: 2 INJECTION INTRAMUSCULAR; INTRAVENOUS at 06:09

## 2020-09-11 RX ADMIN — SODIUM CHLORIDE: 0.9 INJECTION, SOLUTION INTRAVENOUS at 06:09

## 2020-09-11 RX ADMIN — ESMOLOL HYDROCHLORIDE 30 MG: 10 INJECTION, SOLUTION INTRAVENOUS at 10:09

## 2020-09-11 RX ADMIN — ROCURONIUM BROMIDE 10 MG: 10 INJECTION, SOLUTION INTRAVENOUS at 06:09

## 2020-09-11 RX ADMIN — SODIUM BICARBONATE 50 MEQ: 84 INJECTION, SOLUTION INTRAVENOUS at 12:09

## 2020-09-11 RX ADMIN — ETOMIDATE 20 MG: 2 INJECTION, SOLUTION INTRAVENOUS at 06:09

## 2020-09-11 RX ADMIN — AMINOCAPROIC ACID 5 G: 250 INJECTION, SOLUTION INTRAVENOUS at 10:09

## 2020-09-11 RX ADMIN — CHLORHEXIDINE GLUCONATE 15 ML: 1.2 RINSE ORAL at 09:09

## 2020-09-11 RX ADMIN — MAGNESIUM SULFATE IN WATER 2 G: 40 INJECTION, SOLUTION INTRAVENOUS at 06:09

## 2020-09-11 RX ADMIN — MIDAZOLAM HYDROCHLORIDE 1 MG: 1 INJECTION, SOLUTION INTRAMUSCULAR; INTRAVENOUS at 12:09

## 2020-09-11 RX ADMIN — ALBUMIN (HUMAN) 500 ML: 12.5 SOLUTION INTRAVENOUS at 03:09

## 2020-09-11 RX ADMIN — Medication 50 MEQ: at 01:09

## 2020-09-11 NOTE — PROGRESS NOTES
"On license of [facility]  Adult Nutrition   Progress Note (Initial Assessment)     SUMMARY     Recommendations/Interventions:    Recommendation/Intervention: 1. Advance diet as medically able per MD, add Cardiac Diet restrictions. 2. RD to monitor # of days NPO and make recommendations accordingly.  Goals: 1. Diet to advance and patient to meet at least 75% of estimated needs via PO intake of meals. 2. CABG education completed once appropriate.  Nutrition Goal Status: new    Dietitian Rounds Brief:  · Seen 2' ICU status. Patient presented to hospital for [facility]-Atherosclerosis of native coronary artery without angina pectoris. Patient getting CABG today. Will f/u with nutrition recommendations. Will continue to monitor labs and plan of care.  Reason for Assessment  Reason For Assessment: length of stay(ICU status)  Diagnosis: (Atherosclerosis of native coronary artery without angina pectoris)  Relevant Medical History: A-Fib, BPH, HLD, HTN  Interdisciplinary Rounds: attended    Nutrition Risk Screen  Nutrition Risk Screen: no indicators present     MST Score: 0  Have you recently lost weight without trying?: No  Weight loss score: 0  Have you been eating poorly because of a decreased appetite?: No  Appetite score: 0     Nutrition/Diet History  Spiritual, Cultural Beliefs, Alevism Practices, Values that Affect Care: no  Food Allergies: NKFA  Factors Affecting Nutritional Intake: NPO    Anthropometrics  Temp: 98.2 °F (36.8 °C)  Height Method: Stated  Height: 5' 11" (180.3 cm)  Height (inches): 71 in  Weight Method: Bed Scale  Weight: 89.9 kg (198 lb 3.1 oz)  Weight (lb): 198.2 lb  Ideal Body Weight (IBW), Male: 172 lb  % Ideal Body Weight, Male (lb): 115.23 %  BMI (Calculated): 27.7  BMI Grade: 25 - 29.9 - overweight     Weight History:  Wt Readings from Last 10 Encounters:   09/10/20 89.9 kg (198 lb 3.1 oz)   12/22/14 86.2 kg (190 lb)   01/06/14 88.9 kg (196 lb)   03/20/13 84.4 kg (186 lb)     Lab/Procedures/Meds: " Pertinent Labs Reviewed  Clinical Chemistry:  Recent Labs   Lab 09/11/20  0410      K 3.6      CO2 25      BUN 19   CREATININE 0.8   CALCIUM 8.9   PROT 6.6   ALBUMIN 4.1   BILITOT 1.1*   ALKPHOS 77   AST 29   ALT 35   ANIONGAP 9   ESTGFRAFRICA >60.0   EGFRNONAA >60.0   MG 2.0     CBC:   Recent Labs   Lab 09/11/20  0410 09/11/20  0705   WBC 6.83  --    RBC 4.63  --    HGB 14.1  --    HCT 41.0 39     --    MCV 89  --    MCH 30.5  --    MCHC 34.4  --      Medications: Pertinent Medications reviewed  Scheduled Meds:   amLODIPine  5 mg Oral Daily    aspirin  81 mg Oral Daily    atorvastatin  40 mg Oral Daily    normosol-R 300 mL with niCARdipine 5 mg, nitroGLYCERIN 5 mg, sodium bicarbonate 0.3 mEq   Irrigation Once    enoxparin  40 mg Subcutaneous Q24H    ezetimibe  10 mg Oral Daily    famotidine  20 mg Oral BID    custom IVPB builder   Irrigation Once    custom IVPB builder   Irrigation Once    insulin (HUMAN R) infusion (adults)  1 Units/hr Intravenous Once    metoprolol succinate  50 mg Oral Daily    cardioplegic solution   Intravenous Once    cardioplegic solution   Intravenous Once    thromb,rg-dwodkt-onncquv,s-Ca   Topical (Top) Once    thromb,hy-vspvqz-sewvckm,s-Ca   Topical (Top) Once    valsartan  320 mg Oral Daily     Continuous Infusions:  PRN Meds:.sodium chloride, acetaminophen, calcium chloride IVPB, calcium chloride IVPB, calcium chloride IVPB, cloNIDine, heparin (porcine), magnesium oxide, magnesium sulfate IVPB, magnesium sulfate IVPB, magnesium sulfate IVPB, magnesium sulfate IVPB, ondansetron, potassium chloride in water, potassium chloride in water, potassium chloride in water, potassium chloride in water, potassium chloride, potassium chloride, potassium chloride, potassium chloride, sodium phosphate IVPB, sodium phosphate IVPB, sodium phosphate IVPB, sodium phosphate IVPB, sodium phosphate IVPB, vancomycin    Antibiotics (From admission, onward)    Start      Stop Route Frequency Ordered    09/11/20 0644  vancomycin injection      --  As needed (PRN) 09/11/20 0644        Estimated/Assessed Needs    Weight Used For Calorie Calculations: 89.9 kg (198 lb 3.1 oz)  Energy Calorie Requirements (kcal): 2848-9518 kcals/day (20-25 kcals/kg)  Energy Need Method: Kcal/kg  Protein Requirements: 108-135 g/day (1.2-1.5 g/kg)  Weight Used For Protein Calculations: 89.9 kg (198 lb 3.1 oz)     Estimated Fluid Requirement Method: RDA Method    Nutrition Prescription Ordered    Current Diet Order: NPO    Evaluation of Received Nutrient/Fluid Intake    Energy Calories Required: not meeting needs  Protein Required: not meeting needs  Fluid Required: meeting needs  Tolerance: (NPO)  % Intake of Estimated Energy Needs: 0%  % Meal Intake: NPO    Intake/Output Summary (Last 24 hours) at 9/11/2020 0806  Last data filed at 9/11/2020 0800  Gross per 24 hour   Intake 2000 ml   Output 902 ml   Net 1098 ml      Nutrition Risk    Level of Risk/Frequency of Follow-up: high   Monitor and Evaluation    Food and Nutrient Adminstration: diet order  Knowledge/Beliefs/Attitudes: food and nutrition knowledge/skill, beliefs and attitudes  Physical Activity and Function: nutrition-related ADLs and IADLs, factors affecting access to physical activity  Anthropometric Measurements: weight, weight change, body mass index  Biochemical Data, Medical Tests and Procedures: electrolyte and renal panel, lipid profile, gastrointestinal profile, glucose/endocrine profile, inflammatory profile  Nutrition-Focused Physical Findings: overall appearance     Nutrition Follow-Up    RD Follow-up?: Yes  Dayanna Amador RD 09/11/2020 11:46 AM

## 2020-09-11 NOTE — NURSING
Pt extubated at 1819, sats 99 %, placed on 3 L n/c. T-max 101.8 after tylenol given. Dr Wylie aware of pt status.

## 2020-09-11 NOTE — OP NOTE
Dr. Saravia dictating with date of service being 11th September 2020.  Preoperative diagnosis:  Severe atherosclerotic coronary artery disease with history of atrial fibrillation status post ablation.  Postoperative diagnosis same.  Operation:  Coronary artery bypass grafting x4 using left internal mammary artery to left anterior descending coronary artery in separate segments of saphenous vein from the aorta to the posterior descending coronary artery, from the aorta to the obtuse marginal coronary artery, and from the aorta to the diagonal coronary artery using a combination of antegrade and retrograde cardioplegia, mild systemic hypothermia, endoscopic vein harvest from the left leg, and resection of left atrial appendage.  Operation in detail:  The patient was prepped and draped in usual sterile fashion.  The saphenous vein was harvested from the left leg with the Terumo endoscopic harvest system.  The side branches were initially controlled with electrocautery.  The vein was excised and retrogradely flushed and found to be satisfactory.  The vein branches were reinforced with hemoclips and silk ties.  The leg was closed in a single layer of the subcutaneous tissue with running 2 0 Monocryl stitch and skin was closed with running 3 0 subcuticular Monocryl stitch.  The chest was opened through a median sternotomy.  The left internal mammary artery was harvested with the cautery and hemoclips.  After heparinization the mammary artery was divided distally and found to have satisfactory flow.  However in placement of the retractors the mammary artery was injured and had to be removed as a free graft.  The patient was cannulated using the aorta and right atrial appendage and two-stage cannula for venous drainage.  Antegrade and retrograde cardioplegia lines were placed in the aorta and right atrial free wall and into the coronary sinus respectively.  The patient was placed on cardiopulmonary bypass and cooled to  systemic moderate hypothermia.  The cross-clamp was applied and the heart arrested initially with antegrade cardioplegia and then intermittently with retrograde cardioplegia.  Once the heart was satisfactorily arrested the bypasses were done 1st with saphenous vein sewn in end-to-side fashion to the posterior descending coronary artery with running 7 0 Prolene suture.  The graft was measured to length and cut and sewn proximally to the aorta in an end-to-side fashion with running 6 0 Prolene suture.  The vein was then next sewn in end-to-side fashion to the obtuse marginal coronary artery with running 7 0 Prolene suture.  The graft was measured to length and cut and sewn proximally to the aorta in an end-to-side fashion with running 6 0 Prolene suture.  The vein was then neck sewn in end-to-side fashion to the diagonal coronary artery with running 7 0 Prolene suture.  The graft was measured to length and cut and sewn proximally to the aorta in end-to-side fashion with running 6 0 Prolene suture.  The left internal mammary artery free graft was then sewn in end-to-side fashion to the left anterior descending coronary artery with running 7 0 Prolene suture.  The graft was measured to length and cut and sewn proximally to the nelson of the vein graft at proceeded to the diagonal coronary artery.  This was done in end-to-side fashion with running 7 0 Prolene suture.  The patient was rewarming during this time and when this anastomosis was complete the cross-clamp was released.  The patient was weaned from cardiopulmonary bypass once satisfactory hemodynamics were maintained rewarming complete.  Chest tubes were inserted into the left hemithorax and 2 into the anterior mediastinum through separate stab wounds and secured to skin with 0 silk suture.  Two pacing wires were placed in the right ventricle and brought out through separate stab wounds and secured to skin with 2 0 silk suture.  Once the patient was doing well  decannulation was performed and the site secured with their respective pursestrings and oversewn with Prolene suture.  Closure of the sternum was done with interrupted stainless steel wire.  The presternal fascia and subcutaneous tissues were closed with running 0 and 2 0 Monocryl stitch respectively while the skin was closed with running 3 0 subcuticular Monocryl stitch.  Overall patient tolerated the procedure well and there appeared to be no major obvious intraoperative complications.  Estimated blood loss was 700 cc and the patient was brought to the ICU in satisfactory condition.

## 2020-09-11 NOTE — ANESTHESIA PROCEDURE NOTES
[place] [facility]    Diagnosis: Coronary Artery Disease  Patient location during procedure: done in OR  Procedure start time: 9/11/2020 6:25 AM  Timeout: 9/11/2020 6:15 AM  Procedure end time: 9/11/2020 6:46 AM    Staffing  Authorizing Provider: Mathew Eng MD  Performing Provider: Mathew Eng MD    Anesthesiologist was present at the time of the procedure.  Preanesthetic Checklist  Completed: patient identified, site marked, surgical consent, pre-op evaluation, timeout performed, IV checked, risks and benefits discussed, monitors and equipment checked and anesthesia consent given  [place] [facility]  Skin Prep: chlorhexidine gluconate  Local Infiltration: lidocaine  Location: right,  internal jugular vein  Vessel Caliber: medium, compressibility normal  Introducer: 9 Fr single lumen, manometry used.  Device: standard thermodilution catheter  Catheter Size: 8 Fr  Catheter placement by yes. Heme positive aspiration all ports. PAC floated with balloon up until wedgedSterile sheath used  Locked at: 50 cm.Insertion Attempts: 1  Indication: hemodynamic monitoring, intravenous therapy  Ultrasound Guidance  Needle advanced into vessel with real time Ultrasound guidance.  Guidewire confirmed in vessel.  Sterile sheath used.  Assessment  Central Line Bundle Protocol followed. Hand hygiene before procedure, surgical cap worn, surgical mask worn, sterile surgical gloves worn, large sterile drape used.  Verification: ultrasound  Dressing: tegaderm (Locked to the introducer)  Patient: Tolerated Well  Additional Notes  Patient s/p pulmonary artery catheter placement.  CXR obtained after the surgery.  It shows the catheter well placed in the pulmonary artery.  No pneumothorax or other complications noted.

## 2020-09-11 NOTE — NURSING
Pt transferred from cardio by wheelchair. Pt clipped and prepped for cabg in am at this time. Pre op cabg teaching done, and questions answered. Attached to bedside monitor, SR/paced rhythm, BP elevated. Prn meds available, will continue to monitor.

## 2020-09-11 NOTE — TRANSFER OF CARE
"Anesthesia Transfer of Care Note    Patient: Juwan Silver    Procedure(s) Performed: Procedure(s) (LRB):  CORONARY ARTERY BYPASS GRAFT (CABG) (N/A)    Patient location: ICU    Anesthesia Type: general    Transport from OR: Upon arrival to PACU/ICU, patient attached to ventilator and auscultated to confirm bilateral breath sounds and adequate TV. Continuous ECG monitoring in transport. Continuous SpO2 monitoring in transport. Continuos invasive BP monitoring in transport. Transported from OR intubated on 100% O2 by AMBU with adequate controlled ventilation    Post pain: adequate analgesia    Post assessment: no apparent anesthetic complications    Post vital signs: stable    Level of consciousness: sedated    Nausea/Vomiting: no nausea/vomiting    Complications: none    Transfer of care protocol was followed      Last vitals:   Visit Vitals  BP (!) 172/100   Pulse 67   Temp 36.8 °C (98.2 °F)   Resp 10   Ht 5' 11" (1.803 m)   Wt 89.9 kg (198 lb 3.1 oz)   SpO2 98%   BMI 27.64 kg/m²     "

## 2020-09-11 NOTE — PROGRESS NOTES
[facility] Medicine  Progress Note    Patient Name: Juwan Silver  MRN: [id number]  Patient Class: IP- Inpatient   Admission Date: 2020  Length of Stay: 3 days  Attending Physician: Marko Pike MD  Primary Care Provider: Primary Doctor No    Subjective:     Principal Problem:Atherosclerosis of native coronary artery without angina pectoris    Interval history: s/p CABG x 4 today .  Patient seen post op. Intubated but wakes to voice and follows commands. Plans to extubate later today.   Temp elevated, will tx with tylenol    Ros  Unable to obtain: intubated      Scheduled Meds:   amLODIPine  5 mg Oral Daily    aspirin  81 mg Oral Daily    atorvastatin  40 mg Oral Daily    ceFAZolin (ANCEF) IVPB  2 g Intravenous Q8H    chlorhexidine  15 mL Mouth/Throat BID    [START ON 2020] docusate sodium  100 mg Oral BID    ezetimibe  10 mg Oral Daily    famotidine  20 mg Oral BID    metoprolol succinate  50 mg Oral Daily    mupirocin   Nasal BID     Continuous Infusions:   sodium chloride 0.45% 50 mL/hr at 20 1332    insulin (HUMAN R) infusion (adults) 1 Units/hr (20 1200)     PRN Meds:albumin human 5%, calcium gluconate IVPB, calcium gluconate IVPB, calcium gluconate IVPB, cloNIDine, dextrose 50%, dextrose 50%, HYDROcodone-acetaminophen, magnesium sulfate IVPB, magnesium sulfate IVPB, midazolam, morphine, morphine, ondansetron, potassium chloride in water, potassium chloride in water, potassium chloride in water, sodium phosphate IVPB, sodium phosphate IVPB, sodium phosphate IVPB    Review of patient's allergies indicates:   Allergen Reactions    Nitroglycerin Other (See Comments)     Hyper sensitive     Objectives:     Vitals(Most Recent)      BP  Min: 91/54  Max: 184/104  Temp  Av.2 °F (36.8 °C)  Min: 97 °F (36.1 °C)  Max: 99.7 °F (37.6 °C)  Pulse  Av.2  Min: 60  Max: 103  Resp  Av.1  Min: 10  Max: 32  SpO2  Av.2 %  Min: 96 %  Max: 100  "%  Height  Av' 11" (180.3 cm)  Min: 5' 11" (180.3 cm)  Max: 5' 11" (180.3 cm)  Weight  Av.9 kg (198 lb 3.1 oz)  Min: 89.9 kg (198 lb 3.1 oz)  Max: 89.9 kg (198 lb 3.1 oz)             Vitals(Scep19x)  Temp:  [97 °F (36.1 °C)-99.7 °F (37.6 °C)]   Pulse:  []   Resp:  [10-32]   BP: ()/()   SpO2:  [96 %-100 %]   Arterial Line BP: (113-137)/(42-52)     I & O(Ftnb83h)    Intake/Output Summary (Last 24 hours) at 2020 1442  Last data filed at 2020 1102  Gross per 24 hour   Intake 5356 ml   Output 2222 ml   Net 3134 ml       Physical Exam:   General: intubated, wakes to voice, follows commands  HEENT: NCAT. PERRLA. EOMI.     CV: RRR. Surgical incision dressed cdi, chest tubes inplace  Abd: +BS x 4. Soft. ND/NT. No rebound or guarding.   : henson: yellow urine  Ext:  +distal pulses      LABS  CBC  Recent Labs   Lab 09/10/20  0512 09/11/20  0410  09/11/20  1019 20  1152 20  1205   WBC 6.54 6.83  --   --  13.52*  --    RBC 4.54* 4.63  --   --  3.02*  --    HGB 14.2 14.1  --   --  9.5*  --    HCT 40.9 41.0   < > 23* 27.6* 26*    163  --   --  105*  --    MCV 90 89  --   --  91  --    MCH 31.3* 30.5  --   --  31.5*  --    MCHC 34.7 34.4  --   --  34.4  --     < > = values in this interval not displayed.       CMP  Recent Labs   Lab 09/10/20  0512 09/10/20  2009 20  0410 20  1152     --  140 141   K 3.3* 3.2* 3.6 3.9  3.9   CO2 25  --  25 21*     --  106 115*   BUN 21  --  19 17   CREATININE 0.9  --  0.8 0.9     --  106 106     Recent Labs   Lab 09/10/20  0512 20  0410 20  1152   CALCIUM 9.2 8.9 7.4*   MG 2.1 2.0 2.0   PHOS  --   --  2.5*     Recent Labs   Lab 20  0636 09/10/20  0512 20  0410   PROT 6.2 6.3 6.6   ALBUMIN 3.9 4.0 4.1   BILITOT 1.3* 1.4* 1.1*   AST 28 28 29   ALKPHOS 72 76 77   ALT 34 31 35         COAGS  Recent Labs   Lab 200   INR 1.2   APTT 34.4*     Imaging;  Cxr:   IMPRESSION:  1.  Post " CABG changes identified.  2.  Lines and tubes as described.  3.  Mild perihilar opacities likely reflect subsegmental  atelectasis or vascular crowding    Assessment/Plan:      Active Hospital Problems    Diagnosis  POA    *Atherosclerosis of native coronary artery without angina pectoris [I25.10]  Unknown    CAD, multiple vessel [I25.10]  Unknown    HTN (hypertension) [I10]  Unknown    Chronic atrial fibrillation [I48.20]  Unknown    Pacemaker [Z95.0]  Unknown    Abnormal stress test [R94.39]  Yes      Resolved Hospital Problems   No resolved problems to display.     Plan:  S/p CABG x 4v on 9/11  [place]  Routine post op care  Insulin gtt  Electrolyte replacement per sliding scale  Gi ppx: pepcid  Code: full    VTE Risk Mitigation (From admission, onward)    None          Discharge Planning   KELVIN:      Code Status: Prior   Is the patient medically ready for discharge?:     Reason for patient still in hospital (select all that apply): Treatment  Discharge Plan A: Home with family          Jessica Lott DO  Department of Hospital Medicine   [facility]

## 2020-09-11 NOTE — PLAN OF CARE
S/p CABG x4. Off epi gtt. Hemodynamically stable. CO 6.4 CI 3.0. Pt remains on mechanical ventilation at this time on CPAP. Will attempt to extubate if appropriate. Alert, following commands. Adequate intake/output noted. Pacemaker interrogated. Prn pain regimen in place. NADN. Will monitor

## 2020-09-11 NOTE — ANESTHESIA PROCEDURE NOTES
Arterial    Diagnosis: Coronary artery disease    Patient location during procedure: done in OR  Procedure start time: 9/11/2020 6:06 AM  Timeout: 9/11/2020 6:05 AM  Procedure end time: 9/11/2020 6:09 AM    Staffing  Authorizing Provider: Mathew Eng MD  Performing Provider: Mathew Eng MD    Anesthesiologist was present at the time of the procedure.    Preanesthetic Checklist  Completed: patient identified, site marked, surgical consent, pre-op evaluation, timeout performed, IV checked, risks and benefits discussed, monitors and equipment checked and anesthesia consent givenArterial  Skin Prep: chlorhexidine gluconate  Local Infiltration: lidocaine  Orientation: right  Location: radial  Catheter Size: 20 G  Catheter placement by Ultrasound guidance. Heme positive aspiration all ports.  Vessel Caliber: medium, patent, compressibility normal  Vascular Doppler:  not done  Needle advanced into vessel with real time Ultrasound guidance.  Sterile sheath used.Insertion Attempts: 1  Assessment  Dressing: sutured in place and taped, tegaderm and steri-strips  Patient: Tolerated well  Additional Notes  No apparent complications.

## 2020-09-11 NOTE — ANESTHESIA PROCEDURE NOTES
Intubation  Performed by: Dario Davies CRNA  Authorized by: Dario Davies CRNA     Intubation:     Induction:  Intravenous    Intubated:  Postinduction    Mask Ventilation:  N/a    Attempts:  1    Attempted By:  CRNA    Method of Intubation:  Direct    Blade:  Julien 2    Laryngeal View Grade: Grade I - full view of chords      Difficult Airway Encountered?: No      Complications:  None    Airway Device:  Oral endotracheal tube    Airway Device Size:  8.5    Style/Cuff Inflation:  Cuffed    Tube secured:  23    Secured at:  The lips    Placement Verified By:  Capnometry    Complicating Factors:  None    Findings Post-Intubation:  BS equal bilateral and atraumatic/condition of teeth unchanged

## 2020-09-11 NOTE — ANESTHESIA PROCEDURE NOTES
Central Line    Diagnosis: Coronary artery disease.  Coronary artery bypass surgery,  Patient location during procedure: ICU  Procedure start time: 9/11/2020 6:16 AM  Timeout: 9/11/2020 6:15 AM  Procedure end time: 9/11/2020 6:25 AM    Staffing  Authorizing Provider: Mathew Eng MD  Performing Provider: Mathew Eng MD    Staffing  Anesthesiologist: Mathew Eng MD  Performed: anesthesiologist   Anesthesiologist was present at the time of the procedure.  Preanesthetic Checklist  Completed: patient identified, site marked, surgical consent, pre-op evaluation, timeout performed, IV checked, risks and benefits discussed, monitors and equipment checked and anesthesia consent given  Indication   Indication: hemodynamic monitoring, vascular access, med administration     Anesthesia   local infiltration    Central Line   Skin Prep: skin prepped with ChloraPrep, skin prep agent completely dried prior to procedure  maximum sterile barriers used during central venous catheter insertion  hand hygiene performed prior to central venous catheter insertion  Location: right, internal jugular.   Catheter type: triple lumen  Catheter Size: 7.5 Fr  Inserted Catheter Length: 20 cm  Ultrasound: vascular probe with ultrasound  Vessel Caliber: medium, patent  Vascular Doppler:  not done, compressibility normal  Needle advanced into vessel with real time Ultrasound guidance.  Guidewire confirmed in vessel.  Manometry: none  Insertion Attempts: 1   Securement:line sutured, chlorhexidine patch, sterile dressing applied and blood return through all ports    Post-Procedure   X-Ray: no pneumothorax on x-ray, tip termination, successful placement and placement verified by x-ray  Tip termination comments: overlying the superior vena cava   Adverse Events:none    Guidewire Guidewire removed intact. Guidewire removed intact, verified with nurse.  Additional Notes  The chest x-ray was obtained after the surgery in the ICU. The central line is well  placed with its tip overlying the superior vena cava and no pneumothorax.  Central line okay for use.  Please follow protocol and/or primary team's recommendations for it care.

## 2020-09-12 LAB
ANION GAP SERPL CALC-SCNC: 6 MMOL/L (ref 8–16)
BASOPHILS # BLD AUTO: 0.01 K/UL (ref 0–0.2)
BASOPHILS NFR BLD: 0.1 % (ref 0–1.9)
BUN SERPL-MCNC: 23 MG/DL (ref 8–23)
CALCIUM SERPL-MCNC: 7.8 MG/DL (ref 8.7–10.5)
CHLORIDE SERPL-SCNC: 112 MMOL/L (ref 95–110)
CO2 SERPL-SCNC: 21 MMOL/L (ref 23–29)
CREAT SERPL-MCNC: 1.1 MG/DL (ref 0.5–1.4)
DIFFERENTIAL METHOD: ABNORMAL
EOSINOPHIL # BLD AUTO: 0 K/UL (ref 0–0.5)
EOSINOPHIL NFR BLD: 0.1 % (ref 0–8)
ERYTHROCYTE [DISTWIDTH] IN BLOOD BY AUTOMATED COUNT: 12.9 % (ref 11.5–14.5)
EST. GFR  (AFRICAN AMERICAN): >60 ML/MIN/1.73 M^2
EST. GFR  (NON AFRICAN AMERICAN): >60 ML/MIN/1.73 M^2
GLUCOSE SERPL-MCNC: 139 MG/DL (ref 70–110)
HCT VFR BLD AUTO: 26.2 % (ref 40–54)
HGB BLD-MCNC: 8.8 G/DL (ref 14–18)
IMM GRANULOCYTES # BLD AUTO: 0.04 K/UL (ref 0–0.04)
IMM GRANULOCYTES NFR BLD AUTO: 0.3 % (ref 0–0.5)
LYMPHOCYTES # BLD AUTO: 1.3 K/UL (ref 1–4.8)
LYMPHOCYTES NFR BLD: 11 % (ref 18–48)
MAGNESIUM SERPL-MCNC: 2.3 MG/DL (ref 1.6–2.6)
MCH RBC QN AUTO: 31.4 PG (ref 27–31)
MCHC RBC AUTO-ENTMCNC: 33.6 G/DL (ref 32–36)
MCV RBC AUTO: 94 FL (ref 82–98)
MONOCYTES # BLD AUTO: 1.3 K/UL (ref 0.3–1)
MONOCYTES NFR BLD: 11.1 % (ref 4–15)
NEUTROPHILS # BLD AUTO: 9.2 K/UL (ref 1.8–7.7)
NEUTROPHILS NFR BLD: 77.4 % (ref 38–73)
NRBC BLD-RTO: 0 /100 WBC
PLATELET # BLD AUTO: 104 K/UL (ref 150–350)
PMV BLD AUTO: 10.9 FL (ref 9.2–12.9)
POTASSIUM SERPL-SCNC: 4.6 MMOL/L (ref 3.5–5.1)
RBC # BLD AUTO: 2.8 M/UL (ref 4.6–6.2)
SODIUM SERPL-SCNC: 139 MMOL/L (ref 136–145)
WBC # BLD AUTO: 11.88 K/UL (ref 3.9–12.7)

## 2020-09-12 PROCEDURE — 63600175 PHARM REV CODE 636 W HCPCS: Performed by: NURSE PRACTITIONER

## 2020-09-12 PROCEDURE — 63600175 PHARM REV CODE 636 W HCPCS: Performed by: THORACIC SURGERY (CARDIOTHORACIC VASCULAR SURGERY)

## 2020-09-12 PROCEDURE — 94761 N-INVAS EAR/PLS OXIMETRY MLT: CPT

## 2020-09-12 PROCEDURE — 99024 POSTOP FOLLOW-UP VISIT: CPT | Mod: POP,,, | Performed by: NURSE PRACTITIONER

## 2020-09-12 PROCEDURE — 99900035 HC TECH TIME PER 15 MIN (STAT)

## 2020-09-12 PROCEDURE — 27000221 HC OXYGEN, UP TO 24 HOURS

## 2020-09-12 PROCEDURE — 99024 PR POST-OP FOLLOW-UP VISIT: ICD-10-PCS | Mod: POP,,, | Performed by: NURSE PRACTITIONER

## 2020-09-12 PROCEDURE — 20000000 HC ICU ROOM

## 2020-09-12 PROCEDURE — 93005 ELECTROCARDIOGRAM TRACING: CPT | Performed by: INTERNAL MEDICINE

## 2020-09-12 PROCEDURE — 85025 COMPLETE CBC W/AUTO DIFF WBC: CPT

## 2020-09-12 PROCEDURE — 93010 ELECTROCARDIOGRAM REPORT: CPT | Mod: ,,, | Performed by: INTERNAL MEDICINE

## 2020-09-12 PROCEDURE — 94799 UNLISTED PULMONARY SVC/PX: CPT

## 2020-09-12 PROCEDURE — 80048 BASIC METABOLIC PNL TOTAL CA: CPT

## 2020-09-12 PROCEDURE — 25000003 PHARM REV CODE 250: Performed by: THORACIC SURGERY (CARDIOTHORACIC VASCULAR SURGERY)

## 2020-09-12 PROCEDURE — 82962 GLUCOSE BLOOD TEST: CPT

## 2020-09-12 PROCEDURE — 25000003 PHARM REV CODE 250

## 2020-09-12 PROCEDURE — 83735 ASSAY OF MAGNESIUM: CPT

## 2020-09-12 PROCEDURE — 93010 EKG 12-LEAD: ICD-10-PCS | Mod: ,,, | Performed by: INTERNAL MEDICINE

## 2020-09-12 PROCEDURE — 25000003 PHARM REV CODE 250: Performed by: NURSE PRACTITIONER

## 2020-09-12 RX ORDER — HYDROCODONE BITARTRATE AND ACETAMINOPHEN 10; 325 MG/1; MG/1
1 TABLET ORAL EVERY 6 HOURS PRN
Status: DISCONTINUED | OUTPATIENT
Start: 2020-09-12 | End: 2020-09-17 | Stop reason: HOSPADM

## 2020-09-12 RX ORDER — FUROSEMIDE 10 MG/ML
20 INJECTION INTRAMUSCULAR; INTRAVENOUS
Status: COMPLETED | OUTPATIENT
Start: 2020-09-12 | End: 2020-09-13

## 2020-09-12 RX ADMIN — FAMOTIDINE 20 MG: 20 TABLET ORAL at 08:09

## 2020-09-12 RX ADMIN — CHLORHEXIDINE GLUCONATE 15 ML: 1.2 RINSE ORAL at 08:09

## 2020-09-12 RX ADMIN — MUPIROCIN: 20 OINTMENT TOPICAL at 08:09

## 2020-09-12 RX ADMIN — SODIUM CHLORIDE: 0.45 INJECTION, SOLUTION INTRAVENOUS at 06:09

## 2020-09-12 RX ADMIN — EZETIMIBE 10 MG: 10 TABLET ORAL at 08:09

## 2020-09-12 RX ADMIN — HYDROCODONE BITARTRATE AND ACETAMINOPHEN 1 TABLET: 5; 325 TABLET ORAL at 03:09

## 2020-09-12 RX ADMIN — ONDANSETRON 4 MG: 2 INJECTION INTRAMUSCULAR; INTRAVENOUS at 08:09

## 2020-09-12 RX ADMIN — HYDROCODONE BITARTRATE AND ACETAMINOPHEN 1 TABLET: 10; 325 TABLET ORAL at 11:09

## 2020-09-12 RX ADMIN — DOCUSATE SODIUM 100 MG: 100 CAPSULE, LIQUID FILLED ORAL at 08:09

## 2020-09-12 RX ADMIN — CEFAZOLIN SODIUM 2 G: 2 SOLUTION INTRAVENOUS at 06:09

## 2020-09-12 RX ADMIN — MORPHINE SULFATE 2 MG: 2 INJECTION, SOLUTION INTRAMUSCULAR; INTRAVENOUS at 08:09

## 2020-09-12 RX ADMIN — FUROSEMIDE 20 MG: 10 INJECTION, SOLUTION INTRAMUSCULAR; INTRAVENOUS at 11:09

## 2020-09-12 RX ADMIN — ATORVASTATIN CALCIUM 40 MG: 40 TABLET, FILM COATED ORAL at 08:09

## 2020-09-12 RX ADMIN — ASPIRIN 81 MG: 81 TABLET, DELAYED RELEASE ORAL at 08:09

## 2020-09-12 RX ADMIN — METOPROLOL SUCCINATE 50 MG: 50 TABLET, FILM COATED, EXTENDED RELEASE ORAL at 08:09

## 2020-09-12 NOTE — PLAN OF CARE
09/1950   Patient Assessment/Suction   Level of Consciousness (AVPU) alert   Respiratory Effort Normal;Unlabored   Expansion/Accessory Muscles/Retractions no use of accessory muscles   All Lung Fields Breath Sounds diminished   PRE-TX-O2   O2 Device (Oxygen Therapy) High Flow nasal Cannula   Flow (L/min) 3   SpO2 96 %   Pulse Oximetry Type Continuous   $ Pulse Oximetry - Multiple Charge Pulse Oximetry - Multiple   Pulse 95   Resp 17   Incentive Spirometer   $ Incentive Spirometer Charges postop instruction   Incentive Spirometer Predicted Level (mL) 2500   Administration (IS) instruction provided, initial;mouthpiece   Number of Repetitions (IS) 10   Level Incentive Spirometer (mL) 750   Patient Tolerance (IS) good   continue to encourage incentive spirometer

## 2020-09-12 NOTE — PROGRESS NOTES
[place]    Cardiology Progress Note    Subjective:  Patient seen and examined this AM except chest wall pain from surgery. He is sitting up the chair. Chest tubes noted. VSS. Xray reviewed. Tele noted . NSVT x 4 beats.     Objective:  Vital Signs (Most Recent)  Temp: 99.2 °F (37.3 °C) (09/12/20 0400)  Pulse: 98 (09/12/20 0802)  Resp: (!) 22 (09/12/20 0802)  BP: (!) 132/59 (09/12/20 0802)  SpO2: 95 % (09/12/20 0802)    Vital Signs Range (Last 24H):  Temp:  [97 °F (36.1 °C)-101 °F (38.3 °C)]   Pulse:  []   Resp:  [10-27]   BP: ()/(50-85)   SpO2:  [95 %-100 %]   Arterial Line BP: ()/()     I & O (Last 24H):    Intake/Output Summary (Last 24 hours) at 9/12/2020 0822  Last data filed at 9/12/2020 0600  Gross per 24 hour   Intake 5423.21 ml   Output 3470 ml   Net 1953.21 ml       Current Diet:     Current Diet Order   Procedures    Diet Cardiac        Allergies:  Review of patient's allergies indicates:   Allergen Reactions    Nitroglycerin Other (See Comments)     Hyper sensitive       Meds:  Scheduled Meds:   amLODIPine  5 mg Oral Daily    aspirin  81 mg Oral Daily    atorvastatin  40 mg Oral Daily    chlorhexidine  15 mL Mouth/Throat BID    docusate sodium  100 mg Oral BID    ezetimibe  10 mg Oral Daily    famotidine  20 mg Oral BID    metoprolol succinate  50 mg Oral Daily    mupirocin   Nasal BID     Continuous Infusions:   sodium chloride 0.45% 50 mL/hr at 09/12/20 0630    insulin (HUMAN R) infusion (adults) Stopped (09/11/20 1553)     PRN Meds:acetaminophen, albumin human 5%, calcium gluconate IVPB, calcium gluconate IVPB, calcium gluconate IVPB, cloNIDine, dextrose 50%, dextrose 50%, HYDROcodone-acetaminophen, magnesium sulfate IVPB, magnesium sulfate IVPB, midazolam, morphine, morphine, ondansetron, potassium chloride in water, potassium chloride in water, potassium chloride in water, sodium phosphate IVPB, sodium phosphate IVPB, sodium phosphate IVPB    Lab  Results :  Recent Results (from the past 24 hour(s))   ISTAT ACT-K    Collection Time: 09/11/20  8:41 AM   Result Value Ref Range    POC ACTIVATED CLOTTING TIME K 588 (H) 74 - 137 sec    Sample ARTERIAL    ISTAT ACT-K    Collection Time: 09/11/20  9:05 AM   Result Value Ref Range    POC ACTIVATED CLOTTING TIME K 670 (H) 74 - 137 sec    Sample ARTERIAL    ISTAT PROCEDURE    Collection Time: 09/11/20  9:05 AM   Result Value Ref Range    POC PH 7.405 7.35 - 7.45    POC PCO2 42.5 35 - 45 mmHg    POC PO2 716 (H) 80 - 100 mmHg    POC HCO3 26.7 24 - 28 mmol/L    POC BE 2 -2 to 2 mmol/L    POC SATURATED O2 100 95 - 100 %    POC Glucose 92 70 - 110 mg/dL    POC Sodium 141 136 - 145 mmol/L    POC Potassium 4.4 3.5 - 5.1 mmol/L    POC TCO2 28 (H) 23 - 27 mmol/L    POC Ionized Calcium 1.00 (L) 1.06 - 1.42 mmol/L    POC Hematocrit 26 (L) 36 - 54 %PCV    Sample ARTERIAL    ISTAT ACT-K    Collection Time: 09/11/20  9:37 AM   Result Value Ref Range    POC ACTIVATED CLOTTING TIME K 577 (H) 74 - 137 sec    Sample ARTERIAL    ISTAT PROCEDURE    Collection Time: 09/11/20  9:37 AM   Result Value Ref Range    POC PH 7.381 7.35 - 7.45    POC PCO2 39.9 35 - 45 mmHg    POC PO2 339 (H) 80 - 100 mmHg    POC HCO3 23.7 (L) 24 - 28 mmol/L    POC BE -1 -2 to 2 mmol/L    POC SATURATED O2 100 95 - 100 %    POC Glucose 109 70 - 110 mg/dL    POC Sodium 141 136 - 145 mmol/L    POC Potassium 4.8 3.5 - 5.1 mmol/L    POC TCO2 25 23 - 27 mmol/L    POC Ionized Calcium 1.06 1.06 - 1.42 mmol/L    POC Hematocrit 25 (L) 36 - 54 %PCV    Sample ARTERIAL    ISTAT ACT-K    Collection Time: 09/11/20 10:18 AM   Result Value Ref Range    POC ACTIVATED CLOTTING TIME K 109 74 - 137 sec    Sample ARTERIAL    ISTAT PROCEDURE    Collection Time: 09/11/20 10:19 AM   Result Value Ref Range    POC PH 7.363 7.35 - 7.45    POC PCO2 38.5 35 - 45 mmHg    POC PO2 422 (H) 80 - 100 mmHg    POC HCO3 21.9 (L) 24 - 28 mmol/L    POC BE -4 -2 to 2 mmol/L    POC SATURATED O2 100 95 - 100  %    POC Glucose 104 70 - 110 mg/dL    POC Sodium 142 136 - 145 mmol/L    POC Potassium 4.4 3.5 - 5.1 mmol/L    POC TCO2 23 23 - 27 mmol/L    POC Ionized Calcium 1.27 1.06 - 1.42 mmol/L    POC Hematocrit 23 (L) 36 - 54 %PCV    Sample ARTERIAL    CBC auto differential    Collection Time: 09/11/20 11:52 AM   Result Value Ref Range    WBC 13.52 (H) 3.90 - 12.70 K/uL    RBC 3.02 (L) 4.60 - 6.20 M/uL    Hemoglobin 9.5 (L) 14.0 - 18.0 g/dL    Hematocrit 27.6 (L) 40.0 - 54.0 %    Mean Corpuscular Volume 91 82 - 98 fL    Mean Corpuscular Hemoglobin 31.5 (H) 27.0 - 31.0 pg    Mean Corpuscular Hemoglobin Conc 34.4 32.0 - 36.0 g/dL    RDW 12.5 11.5 - 14.5 %    Platelets 105 (L) 150 - 350 K/uL    MPV 9.4 9.2 - 12.9 fL    Immature Granulocytes 0.4 0.0 - 0.5 %    Gran # (ANC) 10.2 (H) 1.8 - 7.7 K/uL    Immature Grans (Abs) 0.05 (H) 0.00 - 0.04 K/uL    Lymph # 2.5 1.0 - 4.8 K/uL    Mono # 0.7 0.3 - 1.0 K/uL    Eos # 0.1 0.0 - 0.5 K/uL    Baso # 0.02 0.00 - 0.20 K/uL    nRBC 0 0 /100 WBC    Gran% 75.7 (H) 38.0 - 73.0 %    Lymph% 18.1 18.0 - 48.0 %    Mono% 4.9 4.0 - 15.0 %    Eosinophil% 0.8 0.0 - 8.0 %    Basophil% 0.1 0.0 - 1.9 %    Platelet Estimate Decreased (A)     Differential Method Automated    Basic metabolic panel    Collection Time: 09/11/20 11:52 AM   Result Value Ref Range    Sodium 141 136 - 145 mmol/L    Potassium 3.9 3.5 - 5.1 mmol/L    Chloride 115 (H) 95 - 110 mmol/L    CO2 21 (L) 23 - 29 mmol/L    Glucose 106 70 - 110 mg/dL    BUN, Bld 17 8 - 23 mg/dL    Creatinine 0.9 0.5 - 1.4 mg/dL    Calcium 7.4 (L) 8.7 - 10.5 mg/dL    Anion Gap 5 (L) 8 - 16 mmol/L    eGFR if African American >60.0 >60 mL/min/1.73 m^2    eGFR if non African American >60.0 >60 mL/min/1.73 m^2   Potassium    Collection Time: 09/11/20 11:52 AM   Result Value Ref Range    Potassium 3.9 3.5 - 5.1 mmol/L   Magnesium    Collection Time: 09/11/20 11:52 AM   Result Value Ref Range    Magnesium 2.0 1.6 - 2.6 mg/dL   Phosphorus    Collection Time:  09/11/20 11:52 AM   Result Value Ref Range    Phosphorus 2.5 (L) 2.7 - 4.5 mg/dL   ISTAT PROCEDURE    Collection Time: 09/11/20 12:05 PM   Result Value Ref Range    POC PH 7.281 (LL) 7.35 - 7.45    POC PCO2 42.2 35 - 45 mmHg    POC PO2 301 (H) 80 - 100 mmHg    POC HCO3 19.9 (L) 24 - 28 mmol/L    POC BE -7 -2 to 2 mmol/L    POC SATURATED O2 100 95 - 100 %    POC pH Temp 7.281     POC pCO2 Temp 42.2 mmHg    POC pO2 Temp 301 mmHg    POC Glucose 103 70 - 110 mg/dL    POC Sodium 145 136 - 145 mmol/L    POC Potassium 3.9 3.5 - 5.1 mmol/L    POC TCO2 21 (L) 23 - 27 mmol/L    POC Ionized Calcium 1.21 1.06 - 1.42 mmol/L    POC Hematocrit 26 (L) 36 - 54 %PCV    Rate 16     Sample ARTERIAL     Site Jesus/UAC     Allens Test N/A     DelSys Adult Vent     Mode SIMV     Vt 500     PEEP 5     PS 10     FiO2 100     POC Temp 37.0 C    POCT glucose    Collection Time: 09/11/20  1:05 PM   Result Value Ref Range    POC Glucose 105 70 - 110   ISTAT PROCEDURE    Collection Time: 09/11/20  1:23 PM   Result Value Ref Range    POC PH 7.242 (LL) 7.35 - 7.45    POC PCO2 45.7 (H) 35 - 45 mmHg    POC PO2 83 80 - 100 mmHg    POC HCO3 19.7 (L) 24 - 28 mmol/L    POC BE -8 -2 to 2 mmol/L    POC SATURATED O2 94 (L) 95 - 100 %    POC TCO2 21 (L) 23 - 27 mmol/L    Rate 20     Sample ARTERIAL     Site Jesus/UAC     Allens Test N/A     DelSys Adult Vent     Mode SIMV     Vt 500     PEEP 5     PS 10     FiO2 60     ETCO2 37    POCT glucose    Collection Time: 09/11/20  2:04 PM   Result Value Ref Range    POC Glucose 103 70 - 110   ISTAT PROCEDURE    Collection Time: 09/11/20  3:03 PM   Result Value Ref Range    POC PH 7.282 (LL) 7.35 - 7.45    POC PCO2 48.8 (H) 35 - 45 mmHg    POC PO2 186 (H) 80 - 100 mmHg    POC HCO3 23.0 (L) 24 - 28 mmol/L    POC BE -4 -2 to 2 mmol/L    POC SATURATED O2 99 95 - 100 %    POC pH Temp 7.282     POC pCO2 Temp 48.8 mmHg    POC pO2 Temp 186 mmHg    POC TCO2 24 23 - 27 mmol/L    Sample ARTERIAL     Site Jesus/UAC     Mandie  Test N/A     DelSys Adult Vent     Mode CPAP     PEEP 5     PS 5     FiO2 60     Spont Rate 21     POC Temp 37.0 C    POCT glucose    Collection Time: 09/11/20  3:35 PM   Result Value Ref Range    POC Glucose 89 70 - 110   ISTAT PROCEDURE    Collection Time: 09/11/20  4:23 PM   Result Value Ref Range    POC PH 7.325 (L) 7.35 - 7.45    POC PCO2 43.5 35 - 45 mmHg    POC PO2 106 (H) 80 - 100 mmHg    POC HCO3 22.7 (L) 24 - 28 mmol/L    POC BE -3 -2 to 2 mmol/L    POC SATURATED O2 98 95 - 100 %    POC pH Temp 7.325     POC pCO2 Temp 43.5 mmHg    POC pO2 Temp 106 mmHg    POC Glucose 109 70 - 110 mg/dL    POC Sodium 147 (H) 136 - 145 mmol/L    POC Potassium 4.3 3.5 - 5.1 mmol/L    POC TCO2 24 23 - 27 mmol/L    POC Ionized Calcium 1.16 1.06 - 1.42 mmol/L    POC Hematocrit 25 (L) 36 - 54 %PCV    Rate 10     Sample ARTERIAL     Site Jesus/UAC     Allens Test N/A     DelSys Adult Vent     Mode SIMV     Vt 500     PEEP 5     PS 10     FiO2 50     POC Temp 37.0 C    CBC auto differential    Collection Time: 09/11/20  4:38 PM   Result Value Ref Range    WBC 13.68 (H) 3.90 - 12.70 K/uL    RBC 2.88 (L) 4.60 - 6.20 M/uL    Hemoglobin 9.0 (L) 14.0 - 18.0 g/dL    Hematocrit 26.3 (L) 40.0 - 54.0 %    Mean Corpuscular Volume 91 82 - 98 fL    Mean Corpuscular Hemoglobin 31.3 (H) 27.0 - 31.0 pg    Mean Corpuscular Hemoglobin Conc 34.2 32.0 - 36.0 g/dL    RDW 12.8 11.5 - 14.5 %    Platelets 101 (L) 150 - 350 K/uL    MPV 9.7 9.2 - 12.9 fL    Immature Granulocytes 0.5 0.0 - 0.5 %    Gran # (ANC) 10.8 (H) 1.8 - 7.7 K/uL    Immature Grans (Abs) 0.07 (H) 0.00 - 0.04 K/uL    Lymph # 1.7 1.0 - 4.8 K/uL    Mono # 1.1 (H) 0.3 - 1.0 K/uL    Eos # 0.0 0.0 - 0.5 K/uL    Baso # 0.01 0.00 - 0.20 K/uL    nRBC 0 0 /100 WBC    Gran% 79.2 (H) 38.0 - 73.0 %    Lymph% 12.1 (L) 18.0 - 48.0 %    Mono% 8.0 4.0 - 15.0 %    Eosinophil% 0.1 0.0 - 8.0 %    Basophil% 0.1 0.0 - 1.9 %    Differential Method Automated    Basic metabolic panel    Collection Time:  09/11/20  4:38 PM   Result Value Ref Range    Sodium 143 136 - 145 mmol/L    Potassium 4.2 3.5 - 5.1 mmol/L    Chloride 113 (H) 95 - 110 mmol/L    CO2 21 (L) 23 - 29 mmol/L    Glucose 119 (H) 70 - 110 mg/dL    BUN, Bld 19 8 - 23 mg/dL    Creatinine 1.0 0.5 - 1.4 mg/dL    Calcium 7.6 (L) 8.7 - 10.5 mg/dL    Anion Gap 9 8 - 16 mmol/L    eGFR if African American >60.0 >60 mL/min/1.73 m^2    eGFR if non African American >60.0 >60 mL/min/1.73 m^2   Magnesium    Collection Time: 09/11/20  4:38 PM   Result Value Ref Range    Magnesium 1.9 1.6 - 2.6 mg/dL   ISTAT PROCEDURE    Collection Time: 09/11/20  6:04 PM   Result Value Ref Range    POC PH 7.403 7.35 - 7.45    POC PCO2 36.4 35 - 45 mmHg    POC PO2 119 (H) 80 - 100 mmHg    POC HCO3 22.7 (L) 24 - 28 mmol/L    POC BE -2 -2 to 2 mmol/L    POC SATURATED O2 99 95 - 100 %    POC pH Temp 7.377     POC pCO2 Temp 39.4 mmHg    POC pO2 Temp 131 mmHg    POC TCO2 24 23 - 27 mmol/L    Sample ARTERIAL     Site Jesus/UAC     Allens Test N/A     DelSys Adult Vent     Mode CPAP     PEEP 5     PS 10     FiO2 45     Spont Rate 18     POC Temp 101.8 F    POCT glucose    Collection Time: 09/11/20  6:06 PM   Result Value Ref Range    POC Glucose 123 (H) 70 - 110   POCT glucose    Collection Time: 09/11/20  7:09 PM   Result Value Ref Range    POC Glucose 127 (H) 70 - 110   CBC auto differential    Collection Time: 09/11/20  7:55 PM   Result Value Ref Range    WBC 12.36 3.90 - 12.70 K/uL    RBC 2.75 (L) 4.60 - 6.20 M/uL    Hemoglobin 8.9 (L) 14.0 - 18.0 g/dL    Hematocrit 25.6 (L) 40.0 - 54.0 %    Mean Corpuscular Volume 93 82 - 98 fL    Mean Corpuscular Hemoglobin 32.4 (H) 27.0 - 31.0 pg    Mean Corpuscular Hemoglobin Conc 34.8 32.0 - 36.0 g/dL    RDW 13.1 11.5 - 14.5 %    Platelets 109 (L) 150 - 350 K/uL    MPV 10.6 9.2 - 12.9 fL    Immature Granulocytes 0.4 0.0 - 0.5 %    Gran # (ANC) 9.8 (H) 1.8 - 7.7 K/uL    Immature Grans (Abs) 0.05 (H) 0.00 - 0.04 K/uL    Lymph # 1.4 1.0 - 4.8 K/uL     Mono # 1.1 (H) 0.3 - 1.0 K/uL    Eos # 0.0 0.0 - 0.5 K/uL    Baso # 0.02 0.00 - 0.20 K/uL    nRBC 0 0 /100 WBC    Gran% 79.1 (H) 38.0 - 73.0 %    Lymph% 11.5 (L) 18.0 - 48.0 %    Mono% 8.7 4.0 - 15.0 %    Eosinophil% 0.1 0.0 - 8.0 %    Basophil% 0.2 0.0 - 1.9 %    Differential Method Automated    Basic metabolic panel    Collection Time: 09/11/20  7:55 PM   Result Value Ref Range    Sodium 143 136 - 145 mmol/L    Potassium 4.5 3.5 - 5.1 mmol/L    Chloride 115 (H) 95 - 110 mmol/L    CO2 21 (L) 23 - 29 mmol/L    Glucose 137 (H) 70 - 110 mg/dL    BUN, Bld 21 8 - 23 mg/dL    Creatinine 1.1 0.5 - 1.4 mg/dL    Calcium 7.8 (L) 8.7 - 10.5 mg/dL    Anion Gap 7 (L) 8 - 16 mmol/L    eGFR if African American >60.0 >60 mL/min/1.73 m^2    eGFR if non African American >60.0 >60 mL/min/1.73 m^2   Magnesium    Collection Time: 09/11/20  7:55 PM   Result Value Ref Range    Magnesium 2.5 1.6 - 2.6 mg/dL   POCT glucose    Collection Time: 09/11/20  7:59 PM   Result Value Ref Range    POC Glucose 138 (H) 70 - 110   ISTAT PROCEDURE    Collection Time: 09/11/20  8:03 PM   Result Value Ref Range    POC PH 7.387 7.35 - 7.45    POC PCO2 39.0 35 - 45 mmHg    POC PO2 72 (L) 80 - 100 mmHg    POC HCO3 23.5 (L) 24 - 28 mmol/L    POC BE -2 -2 to 2 mmol/L    POC SATURATED O2 94 (L) 95 - 100 %    POC Glucose 131 (H) 70 - 110 mg/dL    POC Sodium 146 (H) 136 - 145 mmol/L    POC Potassium 4.6 3.5 - 5.1 mmol/L    POC TCO2 25 23 - 27 mmol/L    POC Ionized Calcium 1.18 1.06 - 1.42 mmol/L    POC Hematocrit 25 (L) 36 - 54 %PCV    Rate 24     Sample ARTERIAL     Site [place]     Allens Test N/A     DelSys Nasal Can     Mode SPONT     Flow 3     FiO2 32     Sp02 98    CBC auto differential    Collection Time: 09/12/20  4:00 AM   Result Value Ref Range    WBC 11.88 3.90 - 12.70 K/uL    RBC 2.80 (L) 4.60 - 6.20 M/uL    Hemoglobin 8.8 (L) 14.0 - 18.0 g/dL    Hematocrit 26.2 (L) 40.0 - 54.0 %    Mean Corpuscular Volume 94 82 - 98 fL    Mean Corpuscular  "Hemoglobin 31.4 (H) 27.0 - 31.0 pg    Mean Corpuscular Hemoglobin Conc 33.6 32.0 - 36.0 g/dL    RDW 12.9 11.5 - 14.5 %    Platelets 104 (L) 150 - 350 K/uL    MPV 10.9 9.2 - 12.9 fL    Immature Granulocytes 0.3 0.0 - 0.5 %    Gran # (ANC) 9.2 (H) 1.8 - 7.7 K/uL    Immature Grans (Abs) 0.04 0.00 - 0.04 K/uL    Lymph # 1.3 1.0 - 4.8 K/uL    Mono # 1.3 (H) 0.3 - 1.0 K/uL    Eos # 0.0 0.0 - 0.5 K/uL    Baso # 0.01 0.00 - 0.20 K/uL    nRBC 0 0 /100 WBC    Gran% 77.4 (H) 38.0 - 73.0 %    Lymph% 11.0 (L) 18.0 - 48.0 %    Mono% 11.1 4.0 - 15.0 %    Eosinophil% 0.1 0.0 - 8.0 %    Basophil% 0.1 0.0 - 1.9 %    Differential Method Automated    Basic metabolic panel    Collection Time: 09/12/20  4:00 AM   Result Value Ref Range    Sodium 139 136 - 145 mmol/L    Potassium 4.6 3.5 - 5.1 mmol/L    Chloride 112 (H) 95 - 110 mmol/L    CO2 21 (L) 23 - 29 mmol/L    Glucose 139 (H) 70 - 110 mg/dL    BUN, Bld 23 8 - 23 mg/dL    Creatinine 1.1 0.5 - 1.4 mg/dL    Calcium 7.8 (L) 8.7 - 10.5 mg/dL    Anion Gap 6 (L) 8 - 16 mmol/L    eGFR if African American >60.0 >60 mL/min/1.73 m^2    eGFR if non African American >60.0 >60 mL/min/1.73 m^2   Magnesium    Collection Time: 09/12/20  4:00 AM   Result Value Ref Range    Magnesium 2.3 1.6 - 2.6 mg/dL         Physical Exam:  Objective:  General Appearance:  Comfortable.    Vital signs: (most recent): Blood pressure (!) 132/59, pulse 98, temperature 99.2 °F (37.3 °C), temperature source Oral, resp. rate (!) 22, height 5' 11" (1.803 m), weight 97.5 kg (214 lb 15.2 oz), SpO2 95 %.    Lungs:  Normal effort and normal respiratory rate.  Breath sounds clear to auscultation.  (Chest tubes )  Heart: Normal rate.  Regular rhythm.  S1 normal and S2 normal.  No gallop or friction rub.   Abdomen: Abdomen is soft.  Bowel sounds are normal.   There is no abdominal tenderness.     Extremities: Normal range of motion.    Neurological: Patient is alert and oriented to person, place and time.        Current " Consults:  IP CONSULT TO CARDIOTHORACIC SURGERY    Assessment/Plan:  Assessment:   1. Coronary artery disease left main disease  Coronary artery bypass grafting x4 using left internal mammary artery to left anterior descending coronary artery, saphenous vein from the aorta to the posterior descending coronary artery, from the aorta to the obtuse marginal coronary artery, and from the aorta to the diagonal coronary artery   2. History of paroxysmal atrial fibrillation  3. Status post multiple ablations  4. History of DDD pacemaker  5. Hypertension  6. Hyperlipidemia  7. BPH    Plan:   Continue current plan of care

## 2020-09-12 NOTE — ANESTHESIA POSTPROCEDURE EVALUATION
Anesthesia Post Evaluation    Patient: Juwan Silver    Procedure(s) Performed: Procedure(s) (LRB):  CORONARY ARTERY BYPASS GRAFT (CABG) (N/A)    Final Anesthesia Type: general    Patient location during evaluation: ICU  Patient participation: Yes- Able to Participate  Level of consciousness: awake and alert and oriented  Post-procedure vital signs: reviewed and stable  Pain management: adequate  Airway patency: patent    PONV status at discharge: No PONV  Anesthetic complications: no      Cardiovascular status: blood pressure returned to baseline and hemodynamically stable  Respiratory status: unassisted, spontaneous ventilation and nasal cannula  Hydration status: euvolemic  Follow-up not needed.          Vitals Value Taken Time   /73 09/12/20 0700   Temp 37.3 °C (99.2 °F) 09/12/20 0400   Pulse 92 09/12/20 0700   Resp 23 09/12/20 0700   SpO2 99 % 09/12/20 0700   Vitals shown include unvalidated device data.      No case tracking events are documented in the log.      Pain/Jorge Score: Pain Rating Prior to Med Admin: 4 (9/12/2020  4:39 AM)  Pain Rating Post Med Admin: 2 (9/12/2020  4:39 AM)

## 2020-09-12 NOTE — PLAN OF CARE
09/12/20 0802   Patient Assessment/Suction   Level of Consciousness (AVPU) alert   Respiratory Effort Unlabored   PRE-TX-O2   O2 Device (Oxygen Therapy) High Flow nasal Cannula   $ Is the patient on Low Flow Oxygen? Yes   Flow (L/min) 4   SpO2 95 %   Pulse Oximetry Type Continuous   $ Pulse Oximetry - Multiple Charge Pulse Oximetry - Multiple   Pulse 98   Resp (!) 22   BP (!) 132/59   Respiratory Evaluation   $ Care Plan Tech Time 15 min

## 2020-09-12 NOTE — PROGRESS NOTES
CVT SURGERY PROGRESS NOTE  Juwan Silver  70 y.o.  1950    Patient's Chief Complaint:  Atherosclerosis of native coronary artery without angina pectoris    HPI: This patient has severe coronary artery disease.  He underwent heart catheterization and coronary angiography today showing significant coronary artery disease involving the left main coronary artery as well as the right coronary artery.  His left ventricular function appears to be fairly well preserved.  He has chronic atrial fibrillation for which she underwent 2 separate sessions of ablation for this and apparently this has been successful.  He still was taking prior to admission the Xarelto.  This has been discontinued.  He has had hypertension.  His other issues well described.  He has had no major recent surgery.  Family history is not pertinent at this time.  He is not a smoker.    9/11/2020: CABG x's 4 with Dr. Saravia.    Last 24 hour Interval:  - Hemodynamically stable  - UO 1,590 ml  - Left pleural ct with 440 ml. No air leak to suction.  - MS ct with 780 ml. No air leak to suction.      Subjective                                                             Objective  Recent Vitals:  Vitals:    09/12/20 0500 09/12/20 0600 09/12/20 0801 09/12/20 0802   BP: 127/67 132/70  (!) 132/59   BP Location: Right arm Right arm     Patient Position: Lying Lying     Pulse: 92 92  98   Resp: 20 18 20 (!) 22   Temp:       TempSrc:       SpO2: 98% 99%  95%   Weight:       Height:           INPATIENT MEDS   sodium chloride 0.45% 50 mL/hr at 09/12/20 0630    insulin (HUMAN R) infusion (adults) Stopped (09/11/20 1553)      amLODIPine  5 mg Oral Daily    aspirin  81 mg Oral Daily    atorvastatin  40 mg Oral Daily    chlorhexidine  15 mL Mouth/Throat BID    docusate sodium  100 mg Oral BID    ezetimibe  10 mg Oral Daily    famotidine  20 mg Oral BID    metoprolol succinate  50 mg Oral Daily    mupirocin   Nasal BID     acetaminophen, albumin human  5%, calcium gluconate IVPB, calcium gluconate IVPB, calcium gluconate IVPB, cloNIDine, dextrose 50%, dextrose 50%, HYDROcodone-acetaminophen, magnesium sulfate IVPB, magnesium sulfate IVPB, midazolam, morphine, morphine, ondansetron, potassium chloride in water, potassium chloride in water, potassium chloride in water, sodium phosphate IVPB, sodium phosphate IVPB, sodium phosphate IVPB  HEMODYNAMICS  PAP: (30-51)/(16-26) 46/21  PAP (Mean):  [22 mmHg-34 mmHg] 30 mmHg  CO:  [4 L/min-7.6 L/min] 6.2 L/min  CI:  [2 L/min/m2-3.5 L/min/m2] 3.1 L/min/m2   Recent O2 Therapy/Vent Settings:  NC  I/O last 24 hrs:  Intake/Output - Last 3 Shifts       09/10 0700 - 09/11 0659 09/11 0700 - 09/12 0659 09/12 0700 - 09/13 0659    P.O.  960     I.V. (mL/kg)  5757.2 (59)     Blood  456     IV Piggyback  250     Total Intake(mL/kg)  7423.2 (76.1)     Urine (mL/kg/hr) 702 (0.3) 1590 (0.7)     Blood  900     Chest Tube  1220     Total Output 702 3710     Net -702 +3713.2                Recent Cardiac Rhythm: Paced    Recent Pain Assessment: Denies    CBC  Recent Labs   Lab 09/11/20 1638 09/11/20 1955 09/12/20  0400   WBC 13.68* 12.36 11.88   RBC 2.88* 2.75* 2.80*   HGB 9.0* 8.9* 8.8*   * 109* 104*   MCV 91 93 94   MCH 31.3* 32.4* 31.4*   MCHC 34.2 34.8 33.6     BMP  Recent Labs   Lab 09/11/20 1638 09/11/20 1955 09/12/20  0400   CO2 21* 21* 21*   BUN 19 21 23   CREATININE 1.0 1.1 1.1   CALCIUM 7.6* 7.8* 7.8*     CARDIAC ENZYMES  No results for input(s): TROPONINI, CPK, CKMB in the last 168 hours.  BNP  No results for input(s): BNP in the last 168 hours.  PT/INR  INR   Date Value Ref Range Status   09/11/2020 1.2  Final     Comment:     Coumadin Therapy:  INR: 2.0-3.0 conventional anticoagulation  INR: 2.5-3.5 intensive anticoagulation           CURRENT CONSULTS:  IP CONSULT TO CARDIOTHORACIC SURGERY    ASSESSMENT/PLAN:    CAD, MV  S/p CABG x 4 (LIMA to LAD)  - POD # 1  - Hemodynamically stable  - Denies ACS Symptoms  - Remains on  NC. Wean per protocol. VSS.  - Tele reviewed - Paced  - H&H stable  - Leukocytosis noted. Likely reactive. Patient remains afebrile  - Chest tubes and pacer wires to remain.  - Surgical Dressings CDI. Surgical incision without evidence of infection  - OOB TID as tolerated  - Continue/Encourage IS  - Cardiac Rehab consulted  - Continue ASA, BB, statin  - Replace electrolytes as needed  - Lasix as ordered    Acute blood loss anemia, post operative  - Expected post surgery.  - H&H: 8.8 & 26.4  - Continue to monitor        GI Prophylaxis: proton pump inhibitor per orders  DVT Prophylaxis:   Anticoagulants   Medication Route Frequency         Jesse Washington AGACNP-BC  9/12/2020  8:39 AM

## 2020-09-12 NOTE — PROGRESS NOTES
[facility] Medicine  Progress Note    Patient Name: Juwan Silver  MRN: [id number]  Patient Class: IP- Inpatient   Admission Date: 2020  Length of Stay: 4 days  Attending Physician: Marko Pike MD  Primary Care Provider: Primary Doctor No    Subjective:     Principal Problem:Atherosclerosis of native coronary artery without angina pectoris    Interval history: pod #1 s/p CABG x 4.  Extubated, celestino removed, sitting up in chair eating. Chest tubes light red fluid, henson in place light yellow urine. Incision site dressed c/d/i.   Chest wall pain tolerable. Denies fever, chills, sob, n/v.     Ros  As above       Scheduled Meds:   amLODIPine  5 mg Oral Daily    aspirin  81 mg Oral Daily    atorvastatin  40 mg Oral Daily    chlorhexidine  15 mL Mouth/Throat BID    docusate sodium  100 mg Oral BID    ezetimibe  10 mg Oral Daily    famotidine  20 mg Oral BID    furosemide (LASIX) IV  20 mg Intravenous Q12H    metoprolol succinate  50 mg Oral Daily    mupirocin   Nasal BID     Continuous Infusions:    PRN Meds:acetaminophen, albumin human 5%, calcium gluconate IVPB, calcium gluconate IVPB, calcium gluconate IVPB, cloNIDine, dextrose 50%, dextrose 50%, HYDROcodone-acetaminophen, HYDROcodone-acetaminophen, magnesium sulfate IVPB, magnesium sulfate IVPB, morphine, morphine, ondansetron, potassium chloride in water, potassium chloride in water, potassium chloride in water, sodium phosphate IVPB, sodium phosphate IVPB, sodium phosphate IVPB    Review of patient's allergies indicates:   Allergen Reactions    Nitroglycerin Other (See Comments)     Hyper sensitive     Objectives:     Vitals(Most Recent)      BP  Min: 86/52  Max: 137/65  Temp  Av.7 °F (37.6 °C)  Min: 98 °F (36.7 °C)  Max: 101 °F (38.3 °C)  Pulse  Av.6  Min: 87  Max: 103  Resp  Av.7  Min: 10  Max: 25  SpO2  Av.5 %  Min: 95 %  Max: 100 %  Weight  Av.5 kg (214 lb 15.2 oz)  Min: 97.5 kg (214 lb 15.2  oz)  Max: 97.5 kg (214 lb 15.2 oz)             Vitals([id number])  Temp:  [98 °F (36.7 °C)-101 °F (38.3 °C)]   Pulse:  []   Resp:  [10-25]   BP: ()/(50-70)   SpO2:  [95 %-100 %]   Arterial Line BP: ()/()     I & O([id number])    Intake/Output Summary (Last 24 hours) at 9/12/2020 1322  Last data filed at 9/12/2020 1115  Gross per 24 hour   Intake 2117.21 ml   Output 2145 ml   Net -27.79 ml       Physical Exam:   General: nad, sitting up in chair,   HEENT: NCAT. PERRLA. EOMI.     CV: RRR. Surgical incision dressed cdi, chest tubes inplace  Lungs: cta b/l  Abd: +BS x 4. Soft. ND/NT. No rebound or guarding.   : henson: yellow urine  Ext:  +distal pulses  Neuro: alert, oriented x 4, no focal deficits      LABS  CBC  Recent Labs   Lab 09/11/20 1638 09/11/20 1955 09/11/20 2003 09/12/20  0400   WBC 13.68* 12.36  --  11.88   RBC 2.88* 2.75*  --  2.80*   HGB 9.0* 8.9*  --  8.8*   HCT 26.3* 25.6* 25* 26.2*   * 109*  --  104*   MCV 91 93  --  94   MCH 31.3* 32.4*  --  31.4*   MCHC 34.2 34.8  --  33.6       CMP  Recent Labs   Lab 09/11/20 1638 09/11/20 1955 09/12/20  0400    143 139   K 4.2 4.5 4.6   CO2 21* 21* 21*   * 115* 112*   BUN 19 21 23   CREATININE 1.0 1.1 1.1   * 137* 139*     Recent Labs   Lab 09/11/20  1152 09/11/20 1638 09/11/20 1955 09/12/20  0400   CALCIUM 7.4* 7.6* 7.8* 7.8*   MG 2.0 1.9 2.5 2.3   PHOS 2.5*  --   --   --      Recent Labs   Lab 09/09/20  0636 09/10/20  0512 09/11/20  0410   PROT 6.2 6.3 6.6   ALBUMIN 3.9 4.0 4.1   BILITOT 1.3* 1.4* 1.1*   AST 28 28 29   ALKPHOS 72 76 77   ALT 34 31 35         COAGS  Recent Labs   Lab 09/11/20  0410   INR 1.2   APTT 34.4*     Imaging;  Cxr:   Impression:  Status post recent CABG with mild cardiomegaly and linear airspace disease in the lung bases suggestive of atelectasis    Assessment/Plan:      Active Hospital Problems    Diagnosis  POA    *Atherosclerosis of native coronary artery without angina pectoris  [I25.10]  Unknown    S/P CABG x 4 [Z95.1]  Not Applicable    CAD, multiple vessel [I25.10]  Unknown    HTN (hypertension) [I10]  Unknown    Chronic atrial fibrillation [I48.20]  Unknown    Pacemaker [Z95.0]  Unknown    Abnormal stress test [R94.39]  Yes      Resolved Hospital Problems   No resolved problems to display.     Plan:  S/p CABG x 4v on 9/11  [place]  Chest tubes per CTS  Routine post op care  OOB, IS  Cardiac Rehab consulted  C/w  ASA, BB, statin  Electrolyte replacement per sliding scale  Gi ppx: pepcid  Code: full    VTE Risk Mitigation (From admission, onward)    None          Discharge Planning   KELVIN:      Code Status: Prior   Is the patient medically ready for discharge?:     Reason for patient still in hospital (select all that apply): Treatment  Discharge Plan A: Home with family          Jessica Lott DO  Department of Hospital Medicine   [facility]

## 2020-09-12 NOTE — PLAN OF CARE
Patient sat up in chair for 5 hours in the morning walked in hallway using rolling walker, sat up in chair for dinner and demos IS correctly.

## 2020-09-12 NOTE — PROGRESS NOTES
"[facility]  Adult Nutrition   Progress Note (Follow-Up)    SUMMARY     Recommendations  Recommendation/Intervention: 1. Continue cardiac diet and encourage PO intake. 2. RD educated patient on diet for CABG.  Goals: 1. Patient to meet at least 75% of estimated energy and protein needs. 2. Patient to express understanding of diet recommendations and lifestyle changes. RD to answer diet related questions should they arise.  Nutrition Goal Status: new  Communication of RD Recs: reviewed with RN    Dietitian Rounds Brief  Patient s/p CABG. PO intake fair ~ 50% first meal. Patient has good appetite. Patient denies any N/V. RD encouraged PO intake. RD educated patient on diet for CABG. Provided and reviewed written coronary artery bypass grafting nutrition therapy. Provided RD contact info. Patient expressed understanding of diet recommendations.     Reason for Assessment  Reason For Assessment: RD follow-up  Diagnosis: (Atherosclerosis of native coronary artery without angina pectoris)  Relevant Medical History: A-Fib, BPH, HLD, HTN  Interdisciplinary Rounds: attended    Nutrition Risk Screen  Nutrition Risk Screen: no indicators present    Nutrition/Diet History  Spiritual, Cultural Beliefs, Islam Practices, Values that Affect Care: no  Food Allergies: NKFA  Factors Affecting Nutritional Intake: None identified at this time    Anthropometrics  Temp: 98 °F (36.7 °C)  Height Method: Stated  Height: 5' 11" (180.3 cm)  Height (inches): 71 in  Weight Method: Bed Scale  Weight: 97.7 kg (215 lb 6.2 oz)  Weight (lb): 215.39 lb  Ideal Body Weight (IBW), Male: 172 lb  % Ideal Body Weight, Male (lb): 115.23 %  BMI (Calculated): 30.1  BMI Grade: 25 - 29.9 - overweight       Weight History:  Wt Readings from Last 10 Encounters:   09/13/20 97.7 kg (215 lb 6.2 oz)   12/22/14 86.2 kg (190 lb)   01/06/14 88.9 kg (196 lb)   03/20/13 84.4 kg (186 lb)       Lab/Procedures/Meds: Pertinent Labs Reviewed    Medications: " Pertinent Medications reviewed    Estimated/Assessed Needs  Weight Used For Calorie Calculations: 89.9 kg (198 lb 3.1 oz)  Energy Calorie Requirements (kcal): 1612-4179 kcals/day (20-25 kcals/kg)  Energy Need Method: Kcal/kg  Protein Requirements: 108-135 g/day (1.2-1.5 g/kg)  Weight Used For Protein Calculations: 89.9 kg (198 lb 3.1 oz)     Estimated Fluid Requirement Method: RDA Method  RDA Method (mL): 1798       Nutrition Prescription Ordered  Current Diet Order: Cardiac Diet    Evaluation of Received Nutrient/Fluid Intake  Energy Calories Required: not meeting needs  Protein Required: not meeting needs  Fluid Required: meeting needs  Tolerance: tolerating     Intake/Output Summary (Last 24 hours) at 9/13/2020 1354  Last data filed at 9/13/2020 1000  Gross per 24 hour   Intake 220 ml   Output 1475 ml   Net -1255 ml      % Intake of Estimated Energy Needs: 50 - 75 %  % Meal Intake: 50 - 75 %    Nutrition Risk  Level of Risk/Frequency of Follow-up: high     Monitor and Evaluation  Food and Nutrient Intake: energy intake, food and beverage intake  Food and Nutrient Adminstration: diet order  Knowledge/Beliefs/Attitudes: beliefs and attitudes, food and nutrition knowledge/skill  Physical Activity and Function: nutrition-related ADLs and IADLs, factors affecting access to physical activity  Anthropometric Measurements: weight, weight change, body mass index  Biochemical Data, Medical Tests and Procedures: electrolyte and renal panel, inflammatory profile, gastrointestinal profile, lipid profile, glucose/endocrine profile  Nutrition-Focused Physical Findings: overall appearance     Nutrition Follow-Up  RD Follow-up?: Yes    Queta Rodríguez RD 09/13/2020 1:55 PM

## 2020-09-13 LAB
ANION GAP SERPL CALC-SCNC: 7 MMOL/L (ref 8–16)
BASOPHILS # BLD AUTO: 0.01 K/UL (ref 0–0.2)
BASOPHILS NFR BLD: 0.1 % (ref 0–1.9)
BUN SERPL-MCNC: 24 MG/DL (ref 8–23)
CALCIUM SERPL-MCNC: 8.3 MG/DL (ref 8.7–10.5)
CHLORIDE SERPL-SCNC: 105 MMOL/L (ref 95–110)
CO2 SERPL-SCNC: 24 MMOL/L (ref 23–29)
CREAT SERPL-MCNC: 1 MG/DL (ref 0.5–1.4)
DIFFERENTIAL METHOD: ABNORMAL
EOSINOPHIL # BLD AUTO: 0 K/UL (ref 0–0.5)
EOSINOPHIL NFR BLD: 0.1 % (ref 0–8)
ERYTHROCYTE [DISTWIDTH] IN BLOOD BY AUTOMATED COUNT: 12.7 % (ref 11.5–14.5)
EST. GFR  (AFRICAN AMERICAN): >60 ML/MIN/1.73 M^2
EST. GFR  (NON AFRICAN AMERICAN): >60 ML/MIN/1.73 M^2
GLUCOSE SERPL-MCNC: 117 MG/DL (ref 70–110)
HCT VFR BLD AUTO: 24.3 % (ref 40–54)
HGB BLD-MCNC: 8.3 G/DL (ref 14–18)
IMM GRANULOCYTES # BLD AUTO: 0.06 K/UL (ref 0–0.04)
IMM GRANULOCYTES NFR BLD AUTO: 0.4 % (ref 0–0.5)
LYMPHOCYTES # BLD AUTO: 1.3 K/UL (ref 1–4.8)
LYMPHOCYTES NFR BLD: 8.9 % (ref 18–48)
MAGNESIUM SERPL-MCNC: 2.2 MG/DL (ref 1.6–2.6)
MCH RBC QN AUTO: 31.4 PG (ref 27–31)
MCHC RBC AUTO-ENTMCNC: 34.2 G/DL (ref 32–36)
MCV RBC AUTO: 92 FL (ref 82–98)
MONOCYTES # BLD AUTO: 1.5 K/UL (ref 0.3–1)
MONOCYTES NFR BLD: 10.6 % (ref 4–15)
NEUTROPHILS # BLD AUTO: 11.4 K/UL (ref 1.8–7.7)
NEUTROPHILS NFR BLD: 79.9 % (ref 38–73)
NRBC BLD-RTO: 0 /100 WBC
PLATELET # BLD AUTO: 92 K/UL (ref 150–350)
PMV BLD AUTO: 10 FL (ref 9.2–12.9)
POTASSIUM SERPL-SCNC: 3.8 MMOL/L (ref 3.5–5.1)
RBC # BLD AUTO: 2.64 M/UL (ref 4.6–6.2)
SODIUM SERPL-SCNC: 136 MMOL/L (ref 136–145)
WBC # BLD AUTO: 14.24 K/UL (ref 3.9–12.7)

## 2020-09-13 PROCEDURE — 25000003 PHARM REV CODE 250

## 2020-09-13 PROCEDURE — 99024 PR POST-OP FOLLOW-UP VISIT: ICD-10-PCS | Mod: POP,,, | Performed by: NURSE PRACTITIONER

## 2020-09-13 PROCEDURE — 85025 COMPLETE CBC W/AUTO DIFF WBC: CPT

## 2020-09-13 PROCEDURE — 27000221 HC OXYGEN, UP TO 24 HOURS

## 2020-09-13 PROCEDURE — 63600175 PHARM REV CODE 636 W HCPCS: Performed by: NURSE PRACTITIONER

## 2020-09-13 PROCEDURE — 93010 ELECTROCARDIOGRAM REPORT: CPT | Mod: ,,, | Performed by: INTERNAL MEDICINE

## 2020-09-13 PROCEDURE — 99900035 HC TECH TIME PER 15 MIN (STAT)

## 2020-09-13 PROCEDURE — 93010 EKG 12-LEAD: ICD-10-PCS | Mod: ,,, | Performed by: INTERNAL MEDICINE

## 2020-09-13 PROCEDURE — 94761 N-INVAS EAR/PLS OXIMETRY MLT: CPT

## 2020-09-13 PROCEDURE — 83735 ASSAY OF MAGNESIUM: CPT

## 2020-09-13 PROCEDURE — 25000003 PHARM REV CODE 250: Performed by: THORACIC SURGERY (CARDIOTHORACIC VASCULAR SURGERY)

## 2020-09-13 PROCEDURE — 63600175 PHARM REV CODE 636 W HCPCS: Performed by: THORACIC SURGERY (CARDIOTHORACIC VASCULAR SURGERY)

## 2020-09-13 PROCEDURE — 20000000 HC ICU ROOM

## 2020-09-13 PROCEDURE — 93005 ELECTROCARDIOGRAM TRACING: CPT | Performed by: INTERNAL MEDICINE

## 2020-09-13 PROCEDURE — 80048 BASIC METABOLIC PNL TOTAL CA: CPT

## 2020-09-13 PROCEDURE — 25000003 PHARM REV CODE 250: Performed by: NURSE PRACTITIONER

## 2020-09-13 PROCEDURE — 99024 POSTOP FOLLOW-UP VISIT: CPT | Mod: POP,,, | Performed by: NURSE PRACTITIONER

## 2020-09-13 RX ORDER — FUROSEMIDE 20 MG/1
20 TABLET ORAL
Status: COMPLETED | OUTPATIENT
Start: 2020-09-13 | End: 2020-09-14

## 2020-09-13 RX ORDER — MORPHINE SULFATE 2 MG/ML
2 INJECTION, SOLUTION INTRAMUSCULAR; INTRAVENOUS EVERY 4 HOURS PRN
Status: DISCONTINUED | OUTPATIENT
Start: 2020-09-13 | End: 2020-09-17 | Stop reason: HOSPADM

## 2020-09-13 RX ORDER — POLYETHYLENE GLYCOL 3350 17 G/17G
17 POWDER, FOR SOLUTION ORAL DAILY
Status: DISCONTINUED | OUTPATIENT
Start: 2020-09-13 | End: 2020-09-17 | Stop reason: HOSPADM

## 2020-09-13 RX ADMIN — MORPHINE SULFATE 2 MG: 2 INJECTION, SOLUTION INTRAMUSCULAR; INTRAVENOUS at 01:09

## 2020-09-13 RX ADMIN — HYDROCODONE BITARTRATE AND ACETAMINOPHEN 1 TABLET: 5; 325 TABLET ORAL at 08:09

## 2020-09-13 RX ADMIN — POTASSIUM CHLORIDE 20 MEQ: 14.9 INJECTION, SOLUTION INTRAVENOUS at 06:09

## 2020-09-13 RX ADMIN — FUROSEMIDE 20 MG: 20 TABLET ORAL at 04:09

## 2020-09-13 RX ADMIN — ATORVASTATIN CALCIUM 40 MG: 40 TABLET, FILM COATED ORAL at 08:09

## 2020-09-13 RX ADMIN — HYDROCODONE BITARTRATE AND ACETAMINOPHEN 1 TABLET: 10; 325 TABLET ORAL at 04:09

## 2020-09-13 RX ADMIN — MUPIROCIN: 20 OINTMENT TOPICAL at 08:09

## 2020-09-13 RX ADMIN — POLYETHYLENE GLYCOL 3350 17 G: 17 POWDER, FOR SOLUTION ORAL at 08:09

## 2020-09-13 RX ADMIN — FAMOTIDINE 20 MG: 20 TABLET ORAL at 08:09

## 2020-09-13 RX ADMIN — CHLORHEXIDINE GLUCONATE 15 ML: 1.2 RINSE ORAL at 08:09

## 2020-09-13 RX ADMIN — DOCUSATE SODIUM 100 MG: 100 CAPSULE, LIQUID FILLED ORAL at 08:09

## 2020-09-13 RX ADMIN — ASPIRIN 81 MG: 81 TABLET, DELAYED RELEASE ORAL at 08:09

## 2020-09-13 RX ADMIN — HYDROCODONE BITARTRATE AND ACETAMINOPHEN 1 TABLET: 5; 325 TABLET ORAL at 11:09

## 2020-09-13 RX ADMIN — HYDROCODONE BITARTRATE AND ACETAMINOPHEN 1 TABLET: 5; 325 TABLET ORAL at 06:09

## 2020-09-13 RX ADMIN — EZETIMIBE 10 MG: 10 TABLET ORAL at 08:09

## 2020-09-13 RX ADMIN — AMLODIPINE BESYLATE 5 MG: 5 TABLET ORAL at 08:09

## 2020-09-13 RX ADMIN — METOPROLOL SUCCINATE 50 MG: 50 TABLET, FILM COATED, EXTENDED RELEASE ORAL at 08:09

## 2020-09-13 RX ADMIN — FUROSEMIDE 20 MG: 10 INJECTION, SOLUTION INTRAMUSCULAR; INTRAVENOUS at 11:09

## 2020-09-13 NOTE — PLAN OF CARE
Hemodynamically stable. Ambulated in masters. Pain well controlled with prn pain meds. Chest tubes remain. Resting quietly in bed.

## 2020-09-13 NOTE — PLAN OF CARE
09/12/20 1924   Patient Assessment/Suction   Level of Consciousness (AVPU) alert   Respiratory Effort Normal;Unlabored   Expansion/Accessory Muscles/Retractions no use of accessory muscles   All Lung Fields Breath Sounds clear   Cough Frequency infrequent   Cough Type nonproductive   PRE-TX-O2   O2 Device (Oxygen Therapy) High Flow nasal Cannula   Flow (L/min) 2   SpO2 95 %   Pulse Oximetry Type Continuous   $ Pulse Oximetry - Multiple Charge Pulse Oximetry - Multiple   Pulse 96   Resp 18   Incentive Spirometer   $ Incentive Spirometer Charges done with encouragement   Incentive Spirometer Predicted Level (mL) 1000   Administration (IS) mouthpiece   Number of Repetitions (IS) 10   Level Incentive Spirometer (mL) 750   Patient Tolerance (IS) fair  (pt. drowsy)   continue to encourage incentive spirometer

## 2020-09-13 NOTE — PROGRESS NOTES
"CVT SURGERY PROGRESS NOTE  Juwan Silver  70 y.o.  1950    Patient's Chief Complaint:  Atherosclerosis of native coronary artery without angina pectoris    HPI: This patient has severe coronary artery disease.  He underwent heart catheterization and coronary angiography today showing significant coronary artery disease involving the left main coronary artery as well as the right coronary artery.  His left ventricular function appears to be fairly well preserved.  He has chronic atrial fibrillation for which she underwent 2 separate sessions of ablation for this and apparently this has been successful.  He still was taking prior to admission the Xarelto.  This has been discontinued.  He has had hypertension.  His other issues well described.  He has had no major recent surgery.  Family history is not pertinent at this time.  He is not a smoker.    9/11/2020: CABG x's 4 with Dr. Saravia.    Last 24 hour Interval:  - Hemodynamically stable  - UO 1,585 ml  - Left pleural ct with 90 ml. No air leak to suction.  - MS ct with 290 ml. No air leak to suction.      Subjective:  Symptoms:  Stable.    Diet:  Adequate intake.    Activity level: Returning to normal.    Pain:  He complains of pain that is mild.  Pain is well controlled.                                                                 Objective:  General Appearance:  Comfortable and in no acute distress.    Vital signs: (most recent): Blood pressure 135/71, pulse 103, temperature 98 °F (36.7 °C), resp. rate 20, height 5' 11" (1.803 m), weight 97.7 kg (215 lb 6.2 oz), SpO2 (!) 93 %.  Vital signs are normal.  No fever.    Output: Producing urine.    Lungs:  Normal effort and normal respiratory rate.  There are decreased breath sounds.    Heart: Normal rate.  Regular rhythm.    Abdomen: Abdomen is soft and non-distended.  Bowel sounds are normal.   There is no abdominal tenderness.     Pulses: Distal pulses are intact.    Neurological: Patient is alert and " oriented to person, place and time.    Skin:  Warm and dry.  (Surgical dressings CDI without evidence of infection)    Recent Vitals:  Vitals:    09/13/20 1000 09/13/20 1100 09/13/20 1130 09/13/20 1146   BP: 128/63 (!) 145/80 135/71    BP Location:       Patient Position:       Pulse: 94 105 103    Resp: (!) 23 (!) 21 18 20   Temp:       TempSrc:       SpO2: 95% (!) 94% (!) 93%    Weight:       Height:           INPATIENT MEDS     amLODIPine  5 mg Oral Daily    aspirin  81 mg Oral Daily    atorvastatin  40 mg Oral Daily    chlorhexidine  15 mL Mouth/Throat BID    docusate sodium  100 mg Oral BID    ezetimibe  10 mg Oral Daily    famotidine  20 mg Oral BID    metoprolol succinate  50 mg Oral Daily    mupirocin   Nasal BID    polyethylene glycol  17 g Oral Daily     acetaminophen, albumin human 5%, calcium gluconate IVPB, calcium gluconate IVPB, calcium gluconate IVPB, cloNIDine, dextrose 50%, dextrose 50%, HYDROcodone-acetaminophen, HYDROcodone-acetaminophen, magnesium sulfate IVPB, magnesium sulfate IVPB, morphine, morphine, ondansetron, potassium chloride in water, potassium chloride in water, potassium chloride in water, sodium phosphate IVPB, sodium phosphate IVPB, sodium phosphate IVPB  HEMODYNAMICS      Recent O2 Therapy/Vent Settings:  NC  I/O last 24 hrs:  Intake/Output - Last 3 Shifts       09/11 0700 - 09/12 0659 09/12 0700 - 09/13 0659 09/13 0700 - 09/14 0659    P.O. 960 120     I.V. (mL/kg) 5757.2 (59) 50 (0.5)     Blood 456      IV Piggyback 250 100     Total Intake(mL/kg) 7423.2 (76.1) 270 (2.8)     Urine (mL/kg/hr) 1590 (0.7) 1585 (0.7) 110 (0.2)    Blood 900      Chest Tube 1220 380 10    Total Output 3710 1965 120    Net +3713.2 -1695 -120               Recent Cardiac Rhythm: Paced    Recent Pain Assessment: Denies    CBC  Recent Labs   Lab 09/11/20  1955 09/12/20  0400 09/13/20  0400   WBC 12.36 11.88 14.24*   RBC 2.75* 2.80* 2.64*   HGB 8.9* 8.8* 8.3*   * 104* 92*   MCV 93 94 92    MCH 32.4* 31.4* 31.4*   MCHC 34.8 33.6 34.2     BMP  Recent Labs   Lab 09/11/20  1955 09/12/20  0400 09/13/20  0400   CO2 21* 21* 24   BUN 21 23 24*   CREATININE 1.1 1.1 1.0   CALCIUM 7.8* 7.8* 8.3*     CARDIAC ENZYMES  No results for input(s): TROPONINI, CPK, CKMB in the last 168 hours.  BNP  No results for input(s): BNP in the last 168 hours.  PT/INR  INR   Date Value Ref Range Status   09/11/2020 1.2  Final     Comment:     Coumadin Therapy:  INR: 2.0-3.0 conventional anticoagulation  INR: 2.5-3.5 intensive anticoagulation           CURRENT CONSULTS:  IP CONSULT TO CARDIOTHORACIC SURGERY    ASSESSMENT/PLAN:    CAD, MV  S/p CABG x 4 (LIMA to LAD)  - POD # 2  - Hemodynamically stable  - Denies ACS Symptoms  - Remains on NC. Wean per protocol. VSS.  - Tele reviewed - Paced  - H&H stable  - Leukocytosis noted. Likely reactive. Patient remains afebrile  - Chest tubes and pacer wires to remain.  - Surgical Dressings CDI. Surgical incision without evidence of infection  - OOB TID as tolerated  - Continue/Encourage IS  - Cardiac Rehab consulted  - Continue ASA, BB, statin  - Replace electrolytes as needed  - Lasix as ordered    Acute blood loss anemia, post operative  - Expected post surgery.  - H&H: 8.3 & 24.3  - Continue to monitor    HTN  Dyslipidemia  - Cardiology following    GI Prophylaxis: proton pump inhibitor per orders  DVT Prophylaxis:   Anticoagulants   Medication Route Frequency         SANTOS Gonzalez-BC  9/13/2020  8:39 AM

## 2020-09-13 NOTE — PLAN OF CARE
09/13/20 0742   Patient Assessment/Suction   Level of Consciousness (AVPU) alert   Respiratory Effort Unlabored   PRE-TX-O2   O2 Device (Oxygen Therapy) High Flow nasal Cannula   $ Is the patient on Low Flow Oxygen? Yes   Flow (L/min) 2   SpO2 95 %   Pulse Oximetry Type Continuous   $ Pulse Oximetry - Multiple Charge Pulse Oximetry - Multiple   Pulse 101   Resp (!) 22   Respiratory Evaluation   $ Care Plan Tech Time 15 min

## 2020-09-13 NOTE — PROGRESS NOTES
[facility]    Cardiology Progress Note    Subjective:  Patient seen and examined this AM except chest wall pain from surgery. He is sitting up the chair, he is more tired today and falling asleep. No BM since prior to bypass. Chest tubes noted. VSS. Xray reviewed. Tele noted . NSVT x 4 beats.     Objective:  Vital Signs (Most Recent)  Temp: 97.9 °F (36.6 °C) (09/13/20 0400)  Pulse: 101 (09/13/20 0742)  Resp: (!) 22 (09/13/20 0742)  BP: (!) 146/79 (09/13/20 0700)  SpO2: 95 % (09/13/20 0742)    Vital Signs Range (Last 24H):  Temp:  [97.9 °F (36.6 °C)-99 °F (37.2 °C)]   Pulse:  []   Resp:  [15-30]   BP: (112-165)/(55-81)   SpO2:  [93 %-100 %]     I & O (Last 24H):    Intake/Output Summary (Last 24 hours) at 9/13/2020 0811  Last data filed at 9/13/2020 0731  Gross per 24 hour   Intake 220 ml   Output 1790 ml   Net -1570 ml       Current Diet:     Current Diet Order   Procedures    Diet Cardiac        Allergies:  Review of patient's allergies indicates:   Allergen Reactions    Nitroglycerin Other (See Comments)     Hyper sensitive       Meds:  Scheduled Meds:   amLODIPine  5 mg Oral Daily    aspirin  81 mg Oral Daily    atorvastatin  40 mg Oral Daily    chlorhexidine  15 mL Mouth/Throat BID    docusate sodium  100 mg Oral BID    ezetimibe  10 mg Oral Daily    famotidine  20 mg Oral BID    furosemide (LASIX) IV  20 mg Intravenous Q12H    metoprolol succinate  50 mg Oral Daily    mupirocin   Nasal BID     Continuous Infusions:    PRN Meds:acetaminophen, albumin human 5%, calcium gluconate IVPB, calcium gluconate IVPB, calcium gluconate IVPB, cloNIDine, dextrose 50%, dextrose 50%, HYDROcodone-acetaminophen, HYDROcodone-acetaminophen, magnesium sulfate IVPB, magnesium sulfate IVPB, morphine, morphine, ondansetron, potassium chloride in water, potassium chloride in water, potassium chloride in water, sodium phosphate IVPB, sodium phosphate IVPB, sodium phosphate IVPB    Lab Results :  Recent  "Results (from the past 24 hour(s))   CBC auto differential    Collection Time: 09/13/20  4:00 AM   Result Value Ref Range    WBC 14.24 (H) 3.90 - 12.70 K/uL    RBC 2.64 (L) 4.60 - 6.20 M/uL    Hemoglobin 8.3 (L) 14.0 - 18.0 g/dL    Hematocrit 24.3 (L) 40.0 - 54.0 %    Mean Corpuscular Volume 92 82 - 98 fL    Mean Corpuscular Hemoglobin 31.4 (H) 27.0 - 31.0 pg    Mean Corpuscular Hemoglobin Conc 34.2 32.0 - 36.0 g/dL    RDW 12.7 11.5 - 14.5 %    Platelets 92 (L) 150 - 350 K/uL    MPV 10.0 9.2 - 12.9 fL    Immature Granulocytes 0.4 0.0 - 0.5 %    Gran # (ANC) 11.4 (H) 1.8 - 7.7 K/uL    Immature Grans (Abs) 0.06 (H) 0.00 - 0.04 K/uL    Lymph # 1.3 1.0 - 4.8 K/uL    Mono # 1.5 (H) 0.3 - 1.0 K/uL    Eos # 0.0 0.0 - 0.5 K/uL    Baso # 0.01 0.00 - 0.20 K/uL    nRBC 0 0 /100 WBC    Gran% 79.9 (H) 38.0 - 73.0 %    Lymph% 8.9 (L) 18.0 - 48.0 %    Mono% 10.6 4.0 - 15.0 %    Eosinophil% 0.1 0.0 - 8.0 %    Basophil% 0.1 0.0 - 1.9 %    Differential Method Automated    Basic metabolic panel    Collection Time: 09/13/20  4:00 AM   Result Value Ref Range    Sodium 136 136 - 145 mmol/L    Potassium 3.8 3.5 - 5.1 mmol/L    Chloride 105 95 - 110 mmol/L    CO2 24 23 - 29 mmol/L    Glucose 117 (H) 70 - 110 mg/dL    BUN, Bld 24 (H) 8 - 23 mg/dL    Creatinine 1.0 0.5 - 1.4 mg/dL    Calcium 8.3 (L) 8.7 - 10.5 mg/dL    Anion Gap 7 (L) 8 - 16 mmol/L    eGFR if African American >60.0 >60 mL/min/1.73 m^2    eGFR if non African American >60.0 >60 mL/min/1.73 m^2   Magnesium    Collection Time: 09/13/20  4:00 AM   Result Value Ref Range    Magnesium 2.2 1.6 - 2.6 mg/dL         Physical Exam:  Objective:  General Appearance:  Comfortable.    Vital signs: (most recent): Blood pressure (!) 146/79, pulse 101, temperature 97.9 °F (36.6 °C), temperature source Oral, resp. rate (!) 22, height 5' 11" (1.803 m), weight 97.7 kg (215 lb 6.2 oz), SpO2 95 %.    Lungs:  Normal effort and normal respiratory rate.  Breath sounds clear to auscultation.  (Chest " tubes )  Heart: Normal rate.  Regular rhythm.  S1 normal and S2 normal.  No gallop or friction rub.   Abdomen: Abdomen is soft.  Bowel sounds are normal.   There is no abdominal tenderness.     Extremities: Normal range of motion.    Neurological: Patient is alert and oriented to person, place and time.        Current Consults:  IP CONSULT TO CARDIOTHORACIC SURGERY    Assessment/Plan:  Assessment:   1. Coronary artery disease left main disease  Coronary artery bypass grafting x4 using left internal mammary artery to left anterior descending coronary artery, saphenous vein from the aorta to the posterior descending coronary artery, from the aorta to the obtuse marginal coronary artery, and from the aorta to the diagonal coronary artery   2. History of paroxysmal atrial fibrillation  3. Status post multiple ablations  4. History of DDD pacemaker  5. Hypertension  6. Hyperlipidemia  7. BPH    Plan:   Continue current plan of care   Stool softner vs Miralax

## 2020-09-13 NOTE — PROGRESS NOTES
[facility] Medicine  Progress Note    Patient Name: Juwan Silver  MRN: [id number]  Patient Class: IP- Inpatient   Admission Date: 2020  Length of Stay: 5 days  Attending Physician: Marko Pike MD  Primary Care Provider: Primary Doctor No    Subjective:     Principal Problem:Atherosclerosis of native coronary artery without angina pectoris    Interval history: pod #2 s/p CABG x 4.  sitting up in chair eating. Chest tubes light red fluid, henson in place light yellow urine. Incision site dressed c/d/i.   Chest wall pain tolerable with current regimen. Denies fever, chills, sob, n/v.     Ros  As above       Scheduled Meds:   amLODIPine  5 mg Oral Daily    aspirin  81 mg Oral Daily    atorvastatin  40 mg Oral Daily    chlorhexidine  15 mL Mouth/Throat BID    docusate sodium  100 mg Oral BID    ezetimibe  10 mg Oral Daily    famotidine  20 mg Oral BID    furosemide  20 mg Oral BID WAKE    metoprolol succinate  50 mg Oral Daily    mupirocin   Nasal BID    polyethylene glycol  17 g Oral Daily     Continuous Infusions:    PRN Meds:acetaminophen, albumin human 5%, calcium gluconate IVPB, calcium gluconate IVPB, calcium gluconate IVPB, cloNIDine, dextrose 50%, dextrose 50%, HYDROcodone-acetaminophen, HYDROcodone-acetaminophen, magnesium sulfate IVPB, magnesium sulfate IVPB, morphine, ondansetron, potassium chloride in water, potassium chloride in water, potassium chloride in water, sodium phosphate IVPB, sodium phosphate IVPB, sodium phosphate IVPB    Review of patient's allergies indicates:   Allergen Reactions    Nitroglycerin Other (See Comments)     Hyper sensitive     Objectives:     Vitals(Most Recent)      BP  Min: 112/55  Max: 165/81  Temp  Av.3 °F (36.8 °C)  Min: 97.9 °F (36.6 °C)  Max: 99 °F (37.2 °C)  Pulse  Av.8  Min: 87  Max: 105  Resp  Av.2  Min: 15  Max: 31  SpO2  Av.4 %  Min: 93 %  Max: 99 %  Weight  Av.7 kg (215 lb 6.2 oz)  Min: 97.7 kg  (215 lb 6.2 oz)  Max: 97.7 kg (215 lb 6.2 oz)             Vitals([id number])  Temp:  [97.9 °F (36.6 °C)-99 °F (37.2 °C)]   Pulse:  []   Resp:  [15-31]   BP: (112-165)/(55-81)   SpO2:  [93 %-99 %]     I & O(Laxr31g)    Intake/Output Summary (Last 24 hours) at 9/13/2020 1257  Last data filed at 9/13/2020 1000  Gross per 24 hour   Intake 220 ml   Output 1475 ml   Net -1255 ml       Physical Exam:   General: nad, sitting up in chair,   HEENT: NCAT. PERRLA. EOMI.     CV: RRR. Surgical incision dressed cdi, chest tubes inplace  Lungs: cta b/l  Abd: +BS x 4. Soft. ND/NT. No rebound or guarding.   : henson: yellow urine  Ext:  +distal pulses  Neuro: alert, oriented x 4, no focal deficits      LABS  CBC  Recent Labs   Lab 09/11/20 1955 09/11/20 2003 09/12/20 0400 09/13/20  0400   WBC 12.36  --  11.88 14.24*   RBC 2.75*  --  2.80* 2.64*   HGB 8.9*  --  8.8* 8.3*   HCT 25.6* 25* 26.2* 24.3*   *  --  104* 92*   MCV 93  --  94 92   MCH 32.4*  --  31.4* 31.4*   MCHC 34.8  --  33.6 34.2       CMP  Recent Labs   Lab 09/11/20 1955 09/12/20 0400 09/13/20  0400    139 136   K 4.5 4.6 3.8   CO2 21* 21* 24   * 112* 105   BUN 21 23 24*   CREATININE 1.1 1.1 1.0   * 139* 117*     Recent Labs   Lab 09/11/20 1152 09/11/20 1955 09/12/20 0400 09/13/20  0400   CALCIUM 7.4*   < > 7.8* 7.8* 8.3*   MG 2.0   < > 2.5 2.3 2.2   PHOS 2.5*  --   --   --   --     < > = values in this interval not displayed.     Recent Labs   Lab 09/09/20  0636 09/10/20  0512 09/11/20 0410   PROT 6.2 6.3 6.6   ALBUMIN 3.9 4.0 4.1   BILITOT 1.3* 1.4* 1.1*   AST 28 28 29   ALKPHOS 72 76 77   ALT 34 31 35         COAGS  Recent Labs   Lab 09/11/20 0410   INR 1.2   APTT 34.4*     Imaging;  Cxr:   Impression:  Status post recent CABG with mild cardiomegaly and linear airspace disease in the lung bases suggestive of atelectasis    Assessment/Plan:      Active Hospital Problems    Diagnosis  POA    *Atherosclerosis of native coronary  artery without angina pectoris [I25.10]  Unknown    S/P CABG x 4 [Z95.1]  Not Applicable    CAD, multiple vessel [I25.10]  Unknown    HTN (hypertension) [I10]  Unknown    Chronic atrial fibrillation [I48.20]  Unknown    Pacemaker [Z95.0]  Unknown    Abnormal stress test [R94.39]  Yes      Resolved Hospital Problems   No resolved problems to display.     Plan:  S/p CABG x 4v on 9/11  Larson  Chest tubes per CTS  Routine post op care  OOB, IS  Cardiac Rehab consulted  C/w  ASA, BB, statin  Electrolyte replacement per sliding scale  Leukocytosis: likely reactive, afebrile, monitoring    Gi ppx: pepcid  Code: full    VTE Risk Mitigation (From admission, onward)    None          Discharge Planning   KELVIN:      Code Status: Prior   Is the patient medically ready for discharge?:     Reason for patient still in hospital (select all that apply): Treatment  Discharge Plan A: Home with family          Jessica Lott DO  Department of Hospital Medicine   [facility]

## 2020-09-14 LAB
ABO + RH BLD: NORMAL
ANION GAP SERPL CALC-SCNC: 8 MMOL/L (ref 8–16)
BASOPHILS # BLD AUTO: 0.02 K/UL (ref 0–0.2)
BASOPHILS NFR BLD: 0.2 % (ref 0–1.9)
BLD GP AB SCN CELLS X3 SERPL QL: NORMAL
BLD PROD TYP BPU: NORMAL
BLOOD UNIT EXPIRATION DATE: NORMAL
BLOOD UNIT TYPE CODE: 5100
BLOOD UNIT TYPE: NORMAL
BUN SERPL-MCNC: 29 MG/DL (ref 8–23)
CALCIUM SERPL-MCNC: 8.4 MG/DL (ref 8.7–10.5)
CHLORIDE SERPL-SCNC: 103 MMOL/L (ref 95–110)
CO2 SERPL-SCNC: 26 MMOL/L (ref 23–29)
CODING SYSTEM: NORMAL
CREAT SERPL-MCNC: 1 MG/DL (ref 0.5–1.4)
DIFFERENTIAL METHOD: ABNORMAL
DISPENSE STATUS: NORMAL
EOSINOPHIL # BLD AUTO: 0.1 K/UL (ref 0–0.5)
EOSINOPHIL NFR BLD: 0.7 % (ref 0–8)
ERYTHROCYTE [DISTWIDTH] IN BLOOD BY AUTOMATED COUNT: 12.8 % (ref 11.5–14.5)
EST. GFR  (AFRICAN AMERICAN): >60 ML/MIN/1.73 M^2
EST. GFR  (NON AFRICAN AMERICAN): >60 ML/MIN/1.73 M^2
GLUCOSE SERPL-MCNC: 114 MG/DL (ref 70–110)
HCT VFR BLD AUTO: 22.2 % (ref 40–54)
HGB BLD-MCNC: 7.7 G/DL (ref 14–18)
IMM GRANULOCYTES # BLD AUTO: 0.05 K/UL (ref 0–0.04)
IMM GRANULOCYTES NFR BLD AUTO: 0.4 % (ref 0–0.5)
LYMPHOCYTES # BLD AUTO: 1.4 K/UL (ref 1–4.8)
LYMPHOCYTES NFR BLD: 12.9 % (ref 18–48)
MAGNESIUM SERPL-MCNC: 2 MG/DL (ref 1.6–2.6)
MCH RBC QN AUTO: 31.7 PG (ref 27–31)
MCHC RBC AUTO-ENTMCNC: 34.7 G/DL (ref 32–36)
MCV RBC AUTO: 91 FL (ref 82–98)
MONOCYTES # BLD AUTO: 1.2 K/UL (ref 0.3–1)
MONOCYTES NFR BLD: 10.6 % (ref 4–15)
NEUTROPHILS # BLD AUTO: 8.4 K/UL (ref 1.8–7.7)
NEUTROPHILS NFR BLD: 75.2 % (ref 38–73)
NRBC BLD-RTO: 0 /100 WBC
NUM UNITS TRANS PACKED RBC: NORMAL
PLATELET # BLD AUTO: 112 K/UL (ref 150–350)
PMV BLD AUTO: 10.2 FL (ref 9.2–12.9)
POTASSIUM SERPL-SCNC: 3.9 MMOL/L (ref 3.5–5.1)
RBC # BLD AUTO: 2.43 M/UL (ref 4.6–6.2)
SODIUM SERPL-SCNC: 137 MMOL/L (ref 136–145)
WBC # BLD AUTO: 11.19 K/UL (ref 3.9–12.7)

## 2020-09-14 PROCEDURE — 25000003 PHARM REV CODE 250: Performed by: NURSE PRACTITIONER

## 2020-09-14 PROCEDURE — 36430 TRANSFUSION BLD/BLD COMPNT: CPT

## 2020-09-14 PROCEDURE — P9016 RBC LEUKOCYTES REDUCED: HCPCS

## 2020-09-14 PROCEDURE — 20000000 HC ICU ROOM

## 2020-09-14 PROCEDURE — 63600175 PHARM REV CODE 636 W HCPCS: Performed by: THORACIC SURGERY (CARDIOTHORACIC VASCULAR SURGERY)

## 2020-09-14 PROCEDURE — 93010 EKG 12-LEAD: ICD-10-PCS | Mod: ,,, | Performed by: INTERNAL MEDICINE

## 2020-09-14 PROCEDURE — 25000003 PHARM REV CODE 250

## 2020-09-14 PROCEDURE — 63600175 PHARM REV CODE 636 W HCPCS: Performed by: NURSE PRACTITIONER

## 2020-09-14 PROCEDURE — 94799 UNLISTED PULMONARY SVC/PX: CPT

## 2020-09-14 PROCEDURE — 25000003 PHARM REV CODE 250: Performed by: THORACIC SURGERY (CARDIOTHORACIC VASCULAR SURGERY)

## 2020-09-14 PROCEDURE — 86901 BLOOD TYPING SEROLOGIC RH(D): CPT

## 2020-09-14 PROCEDURE — 80048 BASIC METABOLIC PNL TOTAL CA: CPT

## 2020-09-14 PROCEDURE — 85025 COMPLETE CBC W/AUTO DIFF WBC: CPT

## 2020-09-14 PROCEDURE — 63600175 PHARM REV CODE 636 W HCPCS: Performed by: STUDENT IN AN ORGANIZED HEALTH CARE EDUCATION/TRAINING PROGRAM

## 2020-09-14 PROCEDURE — 93010 ELECTROCARDIOGRAM REPORT: CPT | Mod: ,,, | Performed by: INTERNAL MEDICINE

## 2020-09-14 PROCEDURE — 93005 ELECTROCARDIOGRAM TRACING: CPT | Performed by: INTERNAL MEDICINE

## 2020-09-14 PROCEDURE — 94761 N-INVAS EAR/PLS OXIMETRY MLT: CPT

## 2020-09-14 PROCEDURE — 83735 ASSAY OF MAGNESIUM: CPT

## 2020-09-14 PROCEDURE — 86920 COMPATIBILITY TEST SPIN: CPT

## 2020-09-14 RX ORDER — HYDROCODONE BITARTRATE AND ACETAMINOPHEN 500; 5 MG/1; MG/1
TABLET ORAL
Status: DISCONTINUED | OUTPATIENT
Start: 2020-09-14 | End: 2020-09-17 | Stop reason: HOSPADM

## 2020-09-14 RX ORDER — ENOXAPARIN SODIUM 100 MG/ML
40 INJECTION SUBCUTANEOUS EVERY 24 HOURS
Status: DISCONTINUED | OUTPATIENT
Start: 2020-09-14 | End: 2020-09-17 | Stop reason: HOSPADM

## 2020-09-14 RX ADMIN — CHLORHEXIDINE GLUCONATE 15 ML: 1.2 RINSE ORAL at 09:09

## 2020-09-14 RX ADMIN — ENOXAPARIN SODIUM 40 MG: 100 INJECTION SUBCUTANEOUS at 08:09

## 2020-09-14 RX ADMIN — MUPIROCIN: 20 OINTMENT TOPICAL at 08:09

## 2020-09-14 RX ADMIN — MUPIROCIN: 20 OINTMENT TOPICAL at 09:09

## 2020-09-14 RX ADMIN — HYDROCODONE BITARTRATE AND ACETAMINOPHEN 1 TABLET: 5; 325 TABLET ORAL at 05:09

## 2020-09-14 RX ADMIN — CHLORHEXIDINE GLUCONATE 15 ML: 1.2 RINSE ORAL at 08:09

## 2020-09-14 RX ADMIN — FAMOTIDINE 20 MG: 20 TABLET ORAL at 09:09

## 2020-09-14 RX ADMIN — ATORVASTATIN CALCIUM 40 MG: 40 TABLET, FILM COATED ORAL at 09:09

## 2020-09-14 RX ADMIN — HYDROCODONE BITARTRATE AND ACETAMINOPHEN 1 TABLET: 5; 325 TABLET ORAL at 11:09

## 2020-09-14 RX ADMIN — EZETIMIBE 10 MG: 10 TABLET ORAL at 09:09

## 2020-09-14 RX ADMIN — FUROSEMIDE 20 MG: 20 TABLET ORAL at 04:09

## 2020-09-14 RX ADMIN — MORPHINE SULFATE 2 MG: 2 INJECTION, SOLUTION INTRAMUSCULAR; INTRAVENOUS at 02:09

## 2020-09-14 RX ADMIN — AMLODIPINE BESYLATE 5 MG: 5 TABLET ORAL at 09:09

## 2020-09-14 RX ADMIN — ASPIRIN 81 MG: 81 TABLET, DELAYED RELEASE ORAL at 09:09

## 2020-09-14 RX ADMIN — FUROSEMIDE 20 MG: 20 TABLET ORAL at 08:09

## 2020-09-14 RX ADMIN — METOPROLOL SUCCINATE 50 MG: 50 TABLET, FILM COATED, EXTENDED RELEASE ORAL at 09:09

## 2020-09-14 RX ADMIN — POTASSIUM CHLORIDE 20 MEQ: 14.9 INJECTION, SOLUTION INTRAVENOUS at 05:09

## 2020-09-14 RX ADMIN — HYDROCODONE BITARTRATE AND ACETAMINOPHEN 1 TABLET: 5; 325 TABLET ORAL at 09:09

## 2020-09-14 RX ADMIN — DOCUSATE SODIUM 100 MG: 100 CAPSULE, LIQUID FILLED ORAL at 09:09

## 2020-09-14 NOTE — PROGRESS NOTES
Cardiac Rehab     Juwan Silver   [id number] 9/14/2020         Cardiac Rehab Phase Taught: Phase 1    Teaching Method: Verbal, Audio/Visual    Handouts: None    Educational Videos: Recovery - Your First Few Days and Preparing for Discharge    Understanding:  Learning indicated by feedback and Verbalize understanding    Comments: pt sitting up in chair, wife at bedside. Postop/discharge CABG video on. Questions answered    Total Time Spent:15mins            Sonali Bonds RN

## 2020-09-14 NOTE — HOSPITAL COURSE
70-year-old male with past medical history of CAD, AFib status post ablation was admitted for continued CAD workup.  Patient had undergone outpatient stress testing which was abnormal.  He had left heart catheterization performed that showed multi-vessel disease.  He was referred to CT surgery for evaluation.  Patient underwent 4 vessel CABG on 09/11.     Chest tubes and pacers removed on 09/14.  Patient stepped to cardiology floor on 09/14.  He received 1 unit PRBCs for postop anemia.  NOAC jj on  Hold

## 2020-09-14 NOTE — PROGRESS NOTES
Cardiac Rehab     Juwan Silver   [id number]   9/14/2020         Cardiac Rehab Phase Taught: Phase 1    Teaching Method: Verbal    Handouts: None    Educational Videos: None    Understanding:  Learning indicated by feedback and Verbalize understanding    Comments: pt in bed, drowsy. Review of postop CABG education including sternal precautions, IS, s/s to report, activity, pain control. Updated by bedside RN. Will follow    Total Time Spent:15mins          Sonali Bonds RN

## 2020-09-14 NOTE — PROGRESS NOTES
"CVT SURGERY PROGRESS NOTE  Juwan Silver  70 y.o.  1950    Patient's Chief Complaint:  Atherosclerosis of native coronary artery without angina pectoris    HPI: This patient has severe coronary artery disease.  He underwent heart catheterization and coronary angiography today showing significant coronary artery disease involving the left main coronary artery as well as the right coronary artery.  His left ventricular function appears to be fairly well preserved.  He has chronic atrial fibrillation for which she underwent 2 separate sessions of ablation for this and apparently this has been successful.  He still was taking prior to admission the Xarelto.  This has been discontinued.  He has had hypertension.  His other issues well described.  He has had no major recent surgery.  Family history is not pertinent at this time.  He is not a smoker.    9/11/2020: CABG x's 4 with Dr. Saravia.    Last 24 hour Interval:  - Hemodynamically stable  - NAD sitting in chair  - Tachycardic overnight  -  ml  - Left pleural ct with 60 ml. No air leak to suction.  - MS ct with 90 ml. No air leak to suction.      Subjective:  Symptoms:  Stable.  No shortness of breath, chest pain, chest pressure or anxiety.    Diet:  Adequate intake.    Activity level: Returning to normal.    Pain:  He complains of pain that is mild.  Pain is well controlled.                                                                 Objective:  General Appearance:  Comfortable and in no acute distress.    Vital signs: (most recent): Blood pressure 136/73, pulse 101, temperature 97.6 °F (36.4 °C), temperature source Axillary, resp. rate 20, height 5' 11" (1.803 m), weight 97.7 kg (215 lb 6.2 oz), SpO2 100 %.  Vital signs are normal.  No fever.    Output: Producing urine.    Lungs:  Normal effort and normal respiratory rate.  There are decreased breath sounds.    Heart: Normal rate.  Regular rhythm.    Abdomen: Abdomen is soft and non-distended.  " Bowel sounds are normal.   There is no abdominal tenderness.     Pulses: Distal pulses are intact.    Neurological: Patient is alert and oriented to person, place and time.    Skin:  Warm and dry.  (Surgical dressings CDI without evidence of infection)    Recent Vitals:  Vitals:    09/14/20 0645 09/14/20 0700 09/14/20 0739 09/14/20 1139   BP:  136/73     BP Location:       Patient Position:       Pulse: 99 94 101    Resp: (!) 39 19 (!) 22 20   Temp:  97.6 °F (36.4 °C)     TempSrc:  Axillary     SpO2: 100% 100% 100%    Weight:       Height:           INPATIENT MEDS     amLODIPine  5 mg Oral Daily    aspirin  81 mg Oral Daily    atorvastatin  40 mg Oral Daily    chlorhexidine  15 mL Mouth/Throat BID    docusate sodium  100 mg Oral BID    ezetimibe  10 mg Oral Daily    famotidine  20 mg Oral BID    furosemide  20 mg Oral BID WAKE    metoprolol succinate  50 mg Oral Daily    mupirocin   Nasal BID    polyethylene glycol  17 g Oral Daily     acetaminophen, albumin human 5%, calcium gluconate IVPB, calcium gluconate IVPB, calcium gluconate IVPB, cloNIDine, dextrose 50%, dextrose 50%, HYDROcodone-acetaminophen, HYDROcodone-acetaminophen, magnesium sulfate IVPB, magnesium sulfate IVPB, morphine, ondansetron, potassium chloride in water, potassium chloride in water, potassium chloride in water, sodium phosphate IVPB, sodium phosphate IVPB, sodium phosphate IVPB  HEMODYNAMICS      Recent O2 Therapy/Vent Settings:  NC  I/O last 24 hrs:  Intake/Output - Last 3 Shifts       09/12 0700 - 09/13 0659 09/13 0700 - 09/14 0659 09/14 0700 - 09/15 0659    P.O. 120 600 300    I.V. (mL/kg) 50 (0.5)      Blood       IV Piggyback 100 100     Total Intake(mL/kg) 270 (2.8) 700 (7.2) 300 (3.1)    Urine (mL/kg/hr) 1585 (0.7) 990 (0.4) 100 (0.2)    Blood       Chest Tube 380 150     Total Output 1965 1140 100    Net -1695 -440 +200               Recent Cardiac Rhythm: Paced    Recent Pain Assessment: Denies    CBC  Recent Labs   Lab  09/12/20  0400 09/13/20  0400 09/14/20  0405   WBC 11.88 14.24* 11.19   RBC 2.80* 2.64* 2.43*   HGB 8.8* 8.3* 7.7*   * 92* 112*   MCV 94 92 91   MCH 31.4* 31.4* 31.7*   MCHC 33.6 34.2 34.7     BMP  Recent Labs   Lab 09/12/20  0400 09/13/20  0400 09/14/20  0405   CO2 21* 24 26   BUN 23 24* 29*   CREATININE 1.1 1.0 1.0   CALCIUM 7.8* 8.3* 8.4*     CARDIAC ENZYMES  No results for input(s): TROPONINI, CPK, CKMB in the last 168 hours.  BNP  No results for input(s): BNP in the last 168 hours.  PT/INR  INR   Date Value Ref Range Status   09/11/2020 1.2  Final     Comment:     Coumadin Therapy:  INR: 2.0-3.0 conventional anticoagulation  INR: 2.5-3.5 intensive anticoagulation           CURRENT CONSULTS:  IP CONSULT TO CARDIOTHORACIC SURGERY    ASSESSMENT/PLAN:    CAD, MV  S/p CABG x 4 (LIMA to LAD)  - POD # 3  - Hemodynamically stable  - Denies ACS Symptoms  - Remains on NC. Wean per protocol. VSS.  - Tele reviewed - Paced/Tachycardic  - H&H trending down  - Leukocytosis noted. Likely reactive. Patient remains afebrile  - Chest tubes and pacer wires removed.  - Surgical Dressings CDI. Surgical incision without evidence of infection  - OOB TID as tolerated  - Continue/Encourage IS  - Cardiac Rehab consulted  - Continue ASA, BB, statin  - Recommend increasing BB as tolerated  - Replace electrolytes as needed  - Lasix as ordered  - May transfer out of ICU    Acute blood loss anemia, post operative  - Expected post surgery.  - Symptomatic on exertion  - H&H: 7.7 & 22.2  - Transfuse 1 unit PRBC  - Continue to monitor    HTN  Dyslipidemia  - Cardiology following    GI Prophylaxis: proton pump inhibitor per orders  DVT Prophylaxis:   Anticoagulants   Medication Route Frequency         ANGELIKA GonzalezP-BC  9/14/2020  8:39 AM

## 2020-09-14 NOTE — PROGRESS NOTES
[facility] Medicine  Progress Note    Patient Name: Juwan Silver  MRN: [id number]  Patient Class: IP- Inpatient   Admission Date: 2020  Length of Stay: 6 days  Attending Physician: Marko Pike MD  Primary Care Provider: Primary Doctor No    Subjective:     Principal Problem:Atherosclerosis of native coronary artery without angina pectoris    Interval history: pod #3 s/p CABG x 4.  AF, slightly elevated HR 90's 100's. Good uop, tolerating diet without n/v.   Chest tubes and pacer wires removed today.  Cleared for step to cardiology by cts and cards.  H/h down to 7.7/22.2: will transfuse 1 unit pRBCs.     Patient seen and examined. Denies any CP, SOB, N/V/D, headaches, fever or chills.       Ros  As above       Scheduled Meds:   amLODIPine  5 mg Oral Daily    aspirin  81 mg Oral Daily    atorvastatin  40 mg Oral Daily    chlorhexidine  15 mL Mouth/Throat BID    docusate sodium  100 mg Oral BID    ezetimibe  10 mg Oral Daily    famotidine  20 mg Oral BID    furosemide  20 mg Oral BID WAKE    metoprolol succinate  50 mg Oral Daily    mupirocin   Nasal BID    polyethylene glycol  17 g Oral Daily     Continuous Infusions:    PRN Meds:sodium chloride, acetaminophen, albumin human 5%, calcium gluconate IVPB, calcium gluconate IVPB, calcium gluconate IVPB, cloNIDine, dextrose 50%, dextrose 50%, HYDROcodone-acetaminophen, HYDROcodone-acetaminophen, magnesium sulfate IVPB, magnesium sulfate IVPB, morphine, ondansetron, potassium chloride in water, potassium chloride in water, potassium chloride in water, sodium phosphate IVPB, sodium phosphate IVPB, sodium phosphate IVPB    Review of patient's allergies indicates:   Allergen Reactions    Nitroglycerin Other (See Comments)     Hyper sensitive     Objectives:     Vitals(Most Recent)      BP  Min: 83/62  Max: 152/82  Temp  Av.1 °F (36.7 °C)  Min: 97.6 °F (36.4 °C)  Max: 98.5 °F (36.9 °C)  Pulse  Av.4  Min: 86  Max:  102  Resp  Av.5  Min: 16  Max: 39  SpO2  Av.8 %  Min: 93 %  Max: 100 %             Vitals(Dirj15j)  Temp:  [97.6 °F (36.4 °C)-98.5 °F (36.9 °C)]   Pulse:  []   Resp:  [16-39]   BP: ()/(62-82)   SpO2:  [93 %-100 %]     I & O([id number])    Intake/Output Summary (Last 24 hours) at 2020 1350  Last data filed at 2020 0901  Gross per 24 hour   Intake 1000 ml   Output 1120 ml   Net -120 ml       Physical Exam:   General: nad, sitting up in chair,   HEENT: NCAT. PERRLA. EOMI.     CV: RRR. Surgical incision dressed cdi, chest tubes inplace  Lungs: cta b/l  Abd: +BS x 4. Soft. ND/NT. No rebound or guarding.   :no henson  Ext:  +distal pulses  Neuro: alert, oriented x 4, no focal deficits      LABS  CBC  Recent Labs   Lab 20  040   WBC 11.88 14.24* 11.19   RBC 2.80* 2.64* 2.43*   HGB 8.8* 8.3* 7.7*   HCT 26.2* 24.3* 22.2*   * 92* 112*   MCV 94 92 91   MCH 31.4* 31.4* 31.7*   MCHC 33.6 34.2 34.7       CMP  Recent Labs   Lab 20  04020  0405    136 137   K 4.6 3.8 3.9   CO2 21* 24 26   * 105 103   BUN 23 24* 29*   CREATININE 1.1 1.0 1.0   * 117* 114*     Recent Labs   Lab 20  1152  20  04020  0405   CALCIUM 7.4*   < > 7.8* 8.3* 8.4*   MG 2.0   < > 2.3 2.2 2.0   PHOS 2.5*  --   --   --   --     < > = values in this interval not displayed.     Recent Labs   Lab 20  0636 09/10/20  0512 20  0410   PROT 6.2 6.3 6.6   ALBUMIN 3.9 4.0 4.1   BILITOT 1.3* 1.4* 1.1*   AST 28 28 29   ALKPHOS 72 76 77   ALT 34 31 35         COAGS  Recent Labs   Lab 20  0410   INR 1.2   APTT 34.4*     Imaging;  Cxr:   FINDINGS:  Portable chest at 05:18 is compared to 2020 shows changes from recent CABG.  The left chest tube, mediastinal drains right IJ catheter and left subclavian vein pacemaker are in stable position.  The cardiomediastinal silhouette is stable.     Lungs are  clear. Pulmonary vasculature is normal. No acute osseous abnormality.     Impression:  Status post recent CABG with no acute pulmonary process    Assessment/Plan:      Active Hospital Problems    Diagnosis  POA    *Atherosclerosis of native coronary artery without angina pectoris [I25.10]  Yes    S/P CABG x 4 [Z95.1]  Not Applicable    CAD, multiple vessel [I25.10]  Yes    HTN (hypertension) [I10]  Yes    Chronic atrial fibrillation [I48.20]  Yes    Pacemaker [Z95.0]  Yes    Abnormal stress test [R94.39]  Yes      Resolved Hospital Problems   No resolved problems to display.     Plan:  S/p CABG x 4v on 9/11  Larson: dc'd   Chest tubes and pacers dc'd by CTS today 9/14  Routine post op care  OOB, IS  Cardiac Rehab consulted  Pt/ot consulted  C/w  ASA, BB, statin  Electrolyte replacement per sliding scale  Leukocytosis:resolved  Post-op anemia: will transfuse 1 unit pRBC  Step to cardiology     Gi ppx: pepcid  dvt ppx: oob,  Code: full    VTE Risk Mitigation (From admission, onward)    None          Discharge Planning   KELVIN:      Code Status: Prior   Is the patient medically ready for discharge?:     Reason for patient still in hospital (select all that apply): Treatment  Discharge Plan A: Home with family          Jessica Lott DO  Department of Hospital Medicine   [facility]

## 2020-09-14 NOTE — PROGRESS NOTES
[facility]    Cardiology Progress Note    Subjective: Patient seen and examined this AM. Reports improvement in his symptoms today. Mediastinal drain slowing output, pleural drain demonstrated minimal output overnight. Up to 1000 on IS. Hemoglobin 7.7. Platelets stable 112. CXR unremarkable. VSS - HR remains in the 100s in a paced rhythm.     Objective:  Vital Signs (Most Recent)  Temp: 98.5 °F (36.9 °C) (09/14/20 0400)  Pulse: 96 (09/14/20 0600)  Resp: 20 (09/14/20 0600)  BP: (!) 145/80 (09/14/20 0600)  SpO2: 100 % (09/14/20 0600)    Vital Signs Range (Last 24H):  Temp:  [98 °F (36.7 °C)-98.5 °F (36.9 °C)]   Pulse:  []   Resp:  [16-35]   BP: ()/(55-82)   SpO2:  [93 %-100 %]     I & O (Last 24H):    Intake/Output Summary (Last 24 hours) at 9/14/2020 0657  Last data filed at 9/14/2020 0600  Gross per 24 hour   Intake 700 ml   Output 1140 ml   Net -440 ml       Current Diet:     Current Diet Order   Procedures    Diet Cardiac        Allergies:  Review of patient's allergies indicates:   Allergen Reactions    Nitroglycerin Other (See Comments)     Hyper sensitive       Meds:  Scheduled Meds:   amLODIPine  5 mg Oral Daily    aspirin  81 mg Oral Daily    atorvastatin  40 mg Oral Daily    chlorhexidine  15 mL Mouth/Throat BID    docusate sodium  100 mg Oral BID    ezetimibe  10 mg Oral Daily    famotidine  20 mg Oral BID    furosemide  20 mg Oral BID WAKE    metoprolol succinate  50 mg Oral Daily    mupirocin   Nasal BID    polyethylene glycol  17 g Oral Daily     Continuous Infusions:    PRN Meds:acetaminophen, albumin human 5%, calcium gluconate IVPB, calcium gluconate IVPB, calcium gluconate IVPB, cloNIDine, dextrose 50%, dextrose 50%, HYDROcodone-acetaminophen, HYDROcodone-acetaminophen, magnesium sulfate IVPB, magnesium sulfate IVPB, morphine, ondansetron, potassium chloride in water, potassium chloride in water, potassium chloride in water, sodium phosphate IVPB, sodium phosphate  "IVPB, sodium phosphate IVPB    Lab Results :  Recent Results (from the past 24 hour(s))   CBC auto differential    Collection Time: 09/14/20  4:05 AM   Result Value Ref Range    WBC 11.19 3.90 - 12.70 K/uL    RBC 2.43 (L) 4.60 - 6.20 M/uL    Hemoglobin 7.7 (L) 14.0 - 18.0 g/dL    Hematocrit 22.2 (L) 40.0 - 54.0 %    Mean Corpuscular Volume 91 82 - 98 fL    Mean Corpuscular Hemoglobin 31.7 (H) 27.0 - 31.0 pg    Mean Corpuscular Hemoglobin Conc 34.7 32.0 - 36.0 g/dL    RDW 12.8 11.5 - 14.5 %    Platelets 112 (L) 150 - 350 K/uL    MPV 10.2 9.2 - 12.9 fL    Immature Granulocytes 0.4 0.0 - 0.5 %    Gran # (ANC) 8.4 (H) 1.8 - 7.7 K/uL    Immature Grans (Abs) 0.05 (H) 0.00 - 0.04 K/uL    Lymph # 1.4 1.0 - 4.8 K/uL    Mono # 1.2 (H) 0.3 - 1.0 K/uL    Eos # 0.1 0.0 - 0.5 K/uL    Baso # 0.02 0.00 - 0.20 K/uL    nRBC 0 0 /100 WBC    Gran% 75.2 (H) 38.0 - 73.0 %    Lymph% 12.9 (L) 18.0 - 48.0 %    Mono% 10.6 4.0 - 15.0 %    Eosinophil% 0.7 0.0 - 8.0 %    Basophil% 0.2 0.0 - 1.9 %    Differential Method Automated    Basic metabolic panel    Collection Time: 09/14/20  4:05 AM   Result Value Ref Range    Sodium 137 136 - 145 mmol/L    Potassium 3.9 3.5 - 5.1 mmol/L    Chloride 103 95 - 110 mmol/L    CO2 26 23 - 29 mmol/L    Glucose 114 (H) 70 - 110 mg/dL    BUN, Bld 29 (H) 8 - 23 mg/dL    Creatinine 1.0 0.5 - 1.4 mg/dL    Calcium 8.4 (L) 8.7 - 10.5 mg/dL    Anion Gap 8 8 - 16 mmol/L    eGFR if African American >60.0 >60 mL/min/1.73 m^2    eGFR if non African American >60.0 >60 mL/min/1.73 m^2   Magnesium    Collection Time: 09/14/20  4:05 AM   Result Value Ref Range    Magnesium 2.0 1.6 - 2.6 mg/dL         Physical Exam:  Objective:  General Appearance:  Comfortable.    Vital signs: (most recent): Blood pressure (!) 145/80, pulse 96, temperature 98.5 °F (36.9 °C), temperature source Oral, resp. rate 20, height 5' 11" (1.803 m), weight 97.7 kg (215 lb 6.2 oz), SpO2 100 %.    Lungs:  Normal effort and normal respiratory rate.  " Breath sounds clear to auscultation.  (Chest tubes )  Heart: Normal rate.  Regular rhythm.  S1 normal and S2 normal.  No gallop or friction rub.   Abdomen: Abdomen is soft.  Bowel sounds are normal.   There is no abdominal tenderness.     Extremities: Normal range of motion.    Neurological: Patient is alert and oriented to person, place and time.        Current Consults:  IP CONSULT TO CARDIOTHORACIC SURGERY    Assessment/Plan:  Assessment:   1.CAD/LM disease - s/p CABG x4  2. History of paroxysmal atrial fibrillation  3. Status post multiple ablations  4. History of DDD pacemaker  5. Hypertension  6. Hyperlipidemia  7. BPH    Plan:   Consider pRBC transfusion today. Defer to hospital medicine.   Will evaluate if tachycardia is anemia-mediated. Consider increasing Toprol XL to 100 mg daily if HR remains elevated despite this.  Continue current cardiac medications otherwise.   CT management per CTS.  OOB/IS. If patient ambulates well following removal of all drains/pacer wires, consider transfer step down to Cards A/B.      Chris Dubon PA-C  09/14/2020

## 2020-09-14 NOTE — PLAN OF CARE
09/13/20 2000   Patient Assessment/Suction   Level of Consciousness (AVPU) alert   Respiratory Effort Normal;Unlabored   Expansion/Accessory Muscles/Retractions no use of accessory muscles   All Lung Fields Breath Sounds equal bilaterally;clear   PRE-TX-O2   O2 Device (Oxygen Therapy) High Flow nasal Cannula   Flow (L/min) 4   SpO2 95 %   Pulse Oximetry Type Continuous   $ Pulse Oximetry - Multiple Charge Pulse Oximetry - Multiple   Oximetry Probe Site Intact   Pulse 90   Resp (!) 22   /62   Positioning   Body Position supine   Head of Bed (HOB) HOB at 30 degrees   Positioning/Transfer Devices pillows;in use   Respiratory Evaluation   $ Care Plan Tech Time 15 min   Evaluation For New Orders

## 2020-09-15 LAB
ALBUMIN SERPL BCP-MCNC: 3.2 G/DL (ref 3.5–5.2)
ALP SERPL-CCNC: 50 U/L (ref 55–135)
ALT SERPL W/O P-5'-P-CCNC: 62 U/L (ref 10–44)
ANION GAP SERPL CALC-SCNC: 8 MMOL/L (ref 8–16)
AST SERPL-CCNC: 107 U/L (ref 10–40)
BILIRUB SERPL-MCNC: 1.5 MG/DL (ref 0.1–1)
BUN SERPL-MCNC: 30 MG/DL (ref 8–23)
CALCIUM SERPL-MCNC: 8.4 MG/DL (ref 8.7–10.5)
CHLORIDE SERPL-SCNC: 104 MMOL/L (ref 95–110)
CO2 SERPL-SCNC: 25 MMOL/L (ref 23–29)
CREAT SERPL-MCNC: 1 MG/DL (ref 0.5–1.4)
ERYTHROCYTE [DISTWIDTH] IN BLOOD BY AUTOMATED COUNT: 13.6 % (ref 11.5–14.5)
EST. GFR  (AFRICAN AMERICAN): >60 ML/MIN/1.73 M^2
EST. GFR  (NON AFRICAN AMERICAN): >60 ML/MIN/1.73 M^2
GLUCOSE SERPL-MCNC: 101 MG/DL (ref 70–110)
HCT VFR BLD AUTO: 25.1 % (ref 40–54)
HGB BLD-MCNC: 8.3 G/DL (ref 14–18)
MCH RBC QN AUTO: 30.5 PG (ref 27–31)
MCHC RBC AUTO-ENTMCNC: 33.1 G/DL (ref 32–36)
MCV RBC AUTO: 92 FL (ref 82–98)
PLATELET # BLD AUTO: 156 K/UL (ref 150–350)
PLATELET BLD QL SMEAR: ABNORMAL
PMV BLD AUTO: 9.6 FL (ref 9.2–12.9)
POTASSIUM SERPL-SCNC: 3.6 MMOL/L (ref 3.5–5.1)
PROT SERPL-MCNC: 5.7 G/DL (ref 6–8.4)
RBC # BLD AUTO: 2.72 M/UL (ref 4.6–6.2)
SODIUM SERPL-SCNC: 137 MMOL/L (ref 136–145)
WBC # BLD AUTO: 9.28 K/UL (ref 3.9–12.7)

## 2020-09-15 PROCEDURE — 99024 POSTOP FOLLOW-UP VISIT: CPT | Mod: POP,,, | Performed by: PHYSICIAN ASSISTANT

## 2020-09-15 PROCEDURE — 25500020 PHARM REV CODE 255: Performed by: SPECIALIST

## 2020-09-15 PROCEDURE — 94761 N-INVAS EAR/PLS OXIMETRY MLT: CPT

## 2020-09-15 PROCEDURE — 63600175 PHARM REV CODE 636 W HCPCS: Performed by: INTERNAL MEDICINE

## 2020-09-15 PROCEDURE — 97116 GAIT TRAINING THERAPY: CPT

## 2020-09-15 PROCEDURE — 80053 COMPREHEN METABOLIC PANEL: CPT

## 2020-09-15 PROCEDURE — 85027 COMPLETE CBC AUTOMATED: CPT

## 2020-09-15 PROCEDURE — 97165 OT EVAL LOW COMPLEX 30 MIN: CPT

## 2020-09-15 PROCEDURE — 25000003 PHARM REV CODE 250: Performed by: PHYSICIAN ASSISTANT

## 2020-09-15 PROCEDURE — 99024 PR POST-OP FOLLOW-UP VISIT: ICD-10-PCS | Mod: POP,,, | Performed by: PHYSICIAN ASSISTANT

## 2020-09-15 PROCEDURE — 25000003 PHARM REV CODE 250: Performed by: STUDENT IN AN ORGANIZED HEALTH CARE EDUCATION/TRAINING PROGRAM

## 2020-09-15 PROCEDURE — 21000000 HC CCU ICU ROOM CHARGE

## 2020-09-15 PROCEDURE — 97535 SELF CARE MNGMENT TRAINING: CPT

## 2020-09-15 PROCEDURE — 97162 PT EVAL MOD COMPLEX 30 MIN: CPT

## 2020-09-15 PROCEDURE — 99900035 HC TECH TIME PER 15 MIN (STAT)

## 2020-09-15 PROCEDURE — 25000003 PHARM REV CODE 250: Performed by: THORACIC SURGERY (CARDIOTHORACIC VASCULAR SURGERY)

## 2020-09-15 PROCEDURE — 25000003 PHARM REV CODE 250: Performed by: INTERNAL MEDICINE

## 2020-09-15 PROCEDURE — 25000003 PHARM REV CODE 250

## 2020-09-15 PROCEDURE — 27000221 HC OXYGEN, UP TO 24 HOURS

## 2020-09-15 PROCEDURE — 63600175 PHARM REV CODE 636 W HCPCS: Performed by: THORACIC SURGERY (CARDIOTHORACIC VASCULAR SURGERY)

## 2020-09-15 PROCEDURE — 94799 UNLISTED PULMONARY SVC/PX: CPT

## 2020-09-15 PROCEDURE — 99900031 HC PATIENT EDUCATION (STAT)

## 2020-09-15 RX ORDER — SODIUM,POTASSIUM PHOSPHATES 280-250MG
2 POWDER IN PACKET (EA) ORAL
Status: DISCONTINUED | OUTPATIENT
Start: 2020-09-15 | End: 2020-09-17 | Stop reason: HOSPADM

## 2020-09-15 RX ORDER — HYDROCODONE BITARTRATE AND ACETAMINOPHEN 5; 325 MG/1; MG/1
1 TABLET ORAL EVERY 6 HOURS PRN
Qty: 28 TABLET | Refills: 0 | Status: SHIPPED | OUTPATIENT
Start: 2020-09-15

## 2020-09-15 RX ORDER — POTASSIUM CHLORIDE 1.5 G/1.58G
40 POWDER, FOR SOLUTION ORAL
Status: DISCONTINUED | OUTPATIENT
Start: 2020-09-15 | End: 2020-09-17 | Stop reason: HOSPADM

## 2020-09-15 RX ORDER — LANOLIN ALCOHOL/MO/W.PET/CERES
800 CREAM (GRAM) TOPICAL
Status: DISCONTINUED | OUTPATIENT
Start: 2020-09-15 | End: 2020-09-17 | Stop reason: HOSPADM

## 2020-09-15 RX ORDER — METOPROLOL SUCCINATE 50 MG/1
100 TABLET, EXTENDED RELEASE ORAL DAILY
Status: DISCONTINUED | OUTPATIENT
Start: 2020-09-15 | End: 2020-09-17

## 2020-09-15 RX ORDER — TAMSULOSIN HYDROCHLORIDE 0.4 MG/1
0.4 CAPSULE ORAL DAILY
Status: DISCONTINUED | OUTPATIENT
Start: 2020-09-15 | End: 2020-09-17 | Stop reason: HOSPADM

## 2020-09-15 RX ADMIN — HYDROCODONE BITARTRATE AND ACETAMINOPHEN 1 TABLET: 5; 325 TABLET ORAL at 01:09

## 2020-09-15 RX ADMIN — EZETIMIBE 10 MG: 10 TABLET ORAL at 08:09

## 2020-09-15 RX ADMIN — METOPROLOL SUCCINATE 100 MG: 50 TABLET, FILM COATED, EXTENDED RELEASE ORAL at 08:09

## 2020-09-15 RX ADMIN — AMLODIPINE BESYLATE 5 MG: 5 TABLET ORAL at 08:09

## 2020-09-15 RX ADMIN — CHLORHEXIDINE GLUCONATE 15 ML: 1.2 RINSE ORAL at 08:09

## 2020-09-15 RX ADMIN — MUPIROCIN: 20 OINTMENT TOPICAL at 08:09

## 2020-09-15 RX ADMIN — DOCUSATE SODIUM 100 MG: 100 CAPSULE, LIQUID FILLED ORAL at 08:09

## 2020-09-15 RX ADMIN — ATORVASTATIN CALCIUM 40 MG: 40 TABLET, FILM COATED ORAL at 08:09

## 2020-09-15 RX ADMIN — FAMOTIDINE 20 MG: 20 TABLET ORAL at 08:09

## 2020-09-15 RX ADMIN — HYDROCODONE BITARTRATE AND ACETAMINOPHEN 1 TABLET: 5; 325 TABLET ORAL at 05:09

## 2020-09-15 RX ADMIN — TAMSULOSIN HYDROCHLORIDE 0.4 MG: 0.4 CAPSULE ORAL at 09:09

## 2020-09-15 RX ADMIN — IOHEXOL 100 ML: 350 INJECTION, SOLUTION INTRAVENOUS at 09:09

## 2020-09-15 RX ADMIN — ENOXAPARIN SODIUM 40 MG: 100 INJECTION SUBCUTANEOUS at 05:09

## 2020-09-15 RX ADMIN — POTASSIUM CHLORIDE 20 MEQ: 14.9 INJECTION, SOLUTION INTRAVENOUS at 05:09

## 2020-09-15 RX ADMIN — ASPIRIN 81 MG: 81 TABLET, DELAYED RELEASE ORAL at 08:09

## 2020-09-15 NOTE — PT/OT/SLP EVAL
Occupational Therapy   Evaluation    Name: Juwan Sivler  MRN: [id number]  Admitting Diagnosis:  Atherosclerosis of native coronary artery without angina pectoris 4 Days Post-Op    Recommendations:     Discharge Recommendations: home  Discharge Equipment Recommendations:  none  Barriers to discharge:  None    Assessment:     Juwan Silver is a 70 y.o. male with a medical diagnosis of Atherosclerosis of native coronary artery without angina pectoris.  He presents with general weakness s/p CABG. Performance deficits affecting function: weakness, impaired endurance, impaired self care skills, impaired functional mobilty, gait instability, impaired balance, impaired cardiopulmonary response to activity.      Rehab Prognosis: Fair; patient would benefit from acute skilled OT services to address these deficits and reach maximum level of function.       Plan:     Patient to be seen 5 x/week to address the above listed problems via self-care/home management, therapeutic activities, therapeutic exercises  · Plan of Care Expires: 10/15/20  · Plan of Care Reviewed with: patient    Subjective     Chief Complaint: None  Patient/Family Comments/goals: to go home.    Occupational Profile:  Living Environment: lives with spouse in a 2 story home with 5 steps to enter and right side rail.  Previous level of function: independent with all ADLs and IADLs, driving.   Roles and Routines: Retired   Equipment Used at Home:  walker, rolling, bedside commode  Assistance upon Discharge: Spouse    Pain/Comfort:  · Pain Rating 1: 0/10  · Pain Rating Post-Intervention 1: 0/10    Patients cultural, spiritual, Jehovah's witness conflicts given the current situation: no    Objective:     Communicated with: nurse prior to session.  Patient found up in chair with telemetry, pulse ox (continuous), peripheral IV, oxygen upon OT entry to room.    General Precautions: Standard, fall, sternal   Orthopedic Precautions:N/A   Braces: N/A      Occupational Performance:    Functional Mobility/Transfers:  · Patient completed Toilet Transfer Step Transfer technique with contact guard assistance with  rolling walker    Activities of Daily Living:  · Grooming: contact guard assistance to wash face sitting in chair.    Cognitive/Visual Perceptual:  Cognitive/Psychosocial Skills:  -       Oriented to: Person, Place, Time and Situation   -       Follows Commands/attention:Follows multistep  commands  -       Communication: clear/fluent  -       Memory: No Deficits noted  -       Safety awareness/insight to disability: intact   -       Mood/Affect/Coping skills/emotional control: Cooperative and Pleasant  Visual/Perceptual:      -Intact Acuity    Physical Exam:  Balance:    -       Sitting/Standing: Contact Guard  Upper Extremity Range of Motion:     -       Right Upper Extremity: WFL  -       Left Upper Extremity: WFL  Upper Extremity Strength:    -       Right Upper Extremity: 4/5  -       Left Upper Extremity: 4/5   Strength:    -       Right Upper Extremity: WFL  -       Left Upper Extremity: WFL  Fine Motor Coordination:    -       Intact    AMPAC 6 Click ADL:  AMPAC Total Score: 24    Treatment & Education:  Patient educated on the purpose of Occupational Therapy and the importance of getting OOB.  Education:    Patient left up in chair with all lines intact and call button in reach    GOALS:   Multidisciplinary Problems     Occupational Therapy Goals        Problem: Occupational Therapy Goal    Goal Priority Disciplines Outcome Interventions   Occupational Therapy Goal     OT, PT/OT     Description: Goals to be met by: dicscharge     Patient will increase functional independence with ADLs by performing:    UE Dressing with Supervision.  LE Dressing with Supervision.  Grooming while standing at sink with Supervision.  Toileting from toilet with Supervision for hygiene and clothing management.   Toilet transfer to toilet with Supervision.  Perform  sitting/standing ADL activity with no verbal cues to maintain sternal precautions.                     History:     Past Medical History:   Diagnosis Date    A-fib     BPH (benign prostatic hyperplasia)     Hernia     Hyperlipidemia     Hypertension        Past Surgical History:   Procedure Laterality Date    ABLATION OF ARRHYTHMOGENIC FOCUS FOR ATRIAL FIBRILLATION      ANGIOGRAM, CORONARY, WITH LEFT HEART CATHETERIZATION Left 9/8/2020    Procedure: ANGIOGRAM,CORONARY,WITH LEFT HEART CATHETERIZATION;  Surgeon: Marko Pike MD;  Location: [facility] CATH/EP LAB;  Service: Cardiology;  Laterality: Left;    cholesterol      CORONARY ARTERY BYPASS GRAFT (CABG) N/A 9/11/2020    Procedure: CORONARY ARTERY BYPASS GRAFT (CABG);  Surgeon: Blu Saravia MD;  Location: [facility] OR;  Service: Cardiothoracic;  Laterality: N/A;    HERNIA REPAIR      hypertension         Time Tracking:     OT Date of Treatment:    OT Start Time: 0851  OT Stop Time: 0918  OT Total Time (min): 27 min    Billable Minutes:Evaluation 10  Self Care/Home Management 17    Samson Rodriguez OT  9/15/2020

## 2020-09-15 NOTE — PROGRESS NOTES
[facility]    Cardiology Progress Note    Subjective: Patient evaluated by virtual rounds today.1U pRBC administered, hemoglobin improved to 8.3 with hematocrit 25.1. Platelets stable 92. Remains tachycardic and on 4L NC. Patient does not use supplemental O2 at home. Patient is on prophylactic Lovenox. BP stable. CXR unremarkable. CTs removed, patient moved to floor.     Objective:  Vital Signs (Most Recent)  Temp: 98.2 °F (36.8 °C) (09/15/20 0400)  Pulse: 102 (09/15/20 0600)  Resp: 20 (09/15/20 0600)  BP: (!) 143/85 (09/15/20 0600)  SpO2: 95 % (09/15/20 0600)    Vital Signs Range (Last 24H):  Temp:  [97.7 °F (36.5 °C)-98.7 °F (37.1 °C)]   Pulse:  []   Resp:  [16-49]   BP: (107-143)/(56-87)   SpO2:  [90 %-100 %]     I & O (Last 24H):    Intake/Output Summary (Last 24 hours) at 9/15/2020 0736  Last data filed at 9/15/2020 0520  Gross per 24 hour   Intake 960 ml   Output 1245 ml   Net -285 ml       Current Diet:     Current Diet Order   Procedures    Diet Cardiac        Allergies:  Review of patient's allergies indicates:   Allergen Reactions    Nitroglycerin Other (See Comments)     Hyper sensitive       Meds:  Scheduled Meds:   amLODIPine  5 mg Oral Daily    aspirin  81 mg Oral Daily    atorvastatin  40 mg Oral Daily    chlorhexidine  15 mL Mouth/Throat BID    docusate sodium  100 mg Oral BID    enoxaparin  40 mg Subcutaneous Q24H    ezetimibe  10 mg Oral Daily    famotidine  20 mg Oral BID    metoprolol succinate  50 mg Oral Daily    mupirocin   Nasal BID    polyethylene glycol  17 g Oral Daily     Continuous Infusions:    PRN Meds:sodium chloride, acetaminophen, albumin human 5%, calcium gluconate IVPB, calcium gluconate IVPB, calcium gluconate IVPB, cloNIDine, dextrose 50%, dextrose 50%, HYDROcodone-acetaminophen, HYDROcodone-acetaminophen, magnesium sulfate IVPB, magnesium sulfate IVPB, morphine, ondansetron, potassium chloride in water, potassium chloride in water, potassium  chloride in water, sodium phosphate IVPB, sodium phosphate IVPB, sodium phosphate IVPB    Lab Results :  Recent Results (from the past 24 hour(s))   Prepare RBC 1 Unit    Collection Time: 09/14/20 12:14 PM   Result Value Ref Range    UNIT NUMBER [id number]     Product Code L6530C47     DISPENSE STATUS TRANSFUSED     CODING SYSTEM [id number]     Unit Blood Type Code 5100     Unit Blood Type O POS     Unit Expiration [id number]    Type & Screen    Collection Time: 09/14/20  2:00 PM   Result Value Ref Range    Group & Rh O POS     Indirect Darnell NEG    CBC Without Differential    Collection Time: 09/15/20  3:50 AM   Result Value Ref Range    WBC 9.28 3.90 - 12.70 K/uL    RBC 2.72 (L) 4.60 - 6.20 M/uL    Hemoglobin 8.3 (L) 14.0 - 18.0 g/dL    Hematocrit 25.1 (L) 40.0 - 54.0 %    Mean Corpuscular Volume 92 82 - 98 fL    Mean Corpuscular Hemoglobin 30.5 27.0 - 31.0 pg    Mean Corpuscular Hemoglobin Conc 33.1 32.0 - 36.0 g/dL    RDW 13.6 11.5 - 14.5 %    Platelets 156 150 - 350 K/uL    MPV 9.6 9.2 - 12.9 fL   Comprehensive metabolic panel    Collection Time: 09/15/20  3:50 AM   Result Value Ref Range    Sodium 137 136 - 145 mmol/L    Potassium 3.6 3.5 - 5.1 mmol/L    Chloride 104 95 - 110 mmol/L    CO2 25 23 - 29 mmol/L    Glucose 101 70 - 110 mg/dL    BUN, Bld 30 (H) 8 - 23 mg/dL    Creatinine 1.0 0.5 - 1.4 mg/dL    Calcium 8.4 (L) 8.7 - 10.5 mg/dL    Total Protein 5.7 (L) 6.0 - 8.4 g/dL    Albumin 3.2 (L) 3.5 - 5.2 g/dL    Total Bilirubin 1.5 (H) 0.1 - 1.0 mg/dL    Alkaline Phosphatase 50 (L) 55 - 135 U/L     (H) 10 - 40 U/L    ALT 62 (H) 10 - 44 U/L    Anion Gap 8 8 - 16 mmol/L    eGFR if African American >60.0 >60 mL/min/1.73 m^2    eGFR if non African American >60.0 >60 mL/min/1.73 m^2   Platelet Review    Collection Time: 09/15/20  3:50 AM   Result Value Ref Range    Platelet Estimate Increased (A)          Physical Exam (Yesterday's exam - none today 2/2 virtual rounds)    Objective:  General Appearance:   "Comfortable.    Vital signs: (most recent): Blood pressure (!) 143/85, pulse 102, temperature 98.2 °F (36.8 °C), temperature source Oral, resp. rate 20, height 5' 11" (1.803 m), weight 96.4 kg (212 lb 8.4 oz), SpO2 95 %.    Lungs:  Normal effort and normal respiratory rate.  Breath sounds clear to auscultation.  (Chest tubes )  Heart: Normal rate.  Regular rhythm.  S1 normal and S2 normal.  No gallop or friction rub.   Abdomen: Abdomen is soft.  Bowel sounds are normal.   There is no abdominal tenderness.     Extremities: Normal range of motion.    Neurological: Patient is alert and oriented to person, place and time.        Current Consults:  IP CONSULT TO CARDIOTHORACIC SURGERY    Assessment/Plan:  Assessment:   1.CAD/LM disease - s/p CABG x4  2. History of paroxysmal atrial fibrillation  3. Status post multiple ablations  4. History of DDD pacemaker  5. Hypertension  6. Hyperlipidemia  7. BPH  8. Tachycardia    Plan:   Consider CTA chest to r/o PE in setting of increased O2 requirements, tachycardia in setting of clear CXR and improved hemoglobin. Defer to hospital medicine once patient is evaluated in person.   In interim, increase Toprol XL to 100 mg daily.   Continue Lovenox, Lasix, cardiac medications otherwise as scheduled.  OOB/IS/Cardiac rehab.      Chris Dubon PA-C  09/15/2020  "

## 2020-09-15 NOTE — PROGRESS NOTES
"CVT SURGERY PROGRESS NOTE  Juwan Silver  70 y.o.  1950    Patient's Chief Complaint:  Atherosclerosis of native coronary artery without angina pectoris    Subjective:  Symptoms:  Stable.  No shortness of breath or chest pain.    Diet:  Adequate intake.    Activity level: Returning to normal.    Pain:  He complains of pain that is mild.  He reports pain is improving.  Pain is well controlled.            HPI:  70 year old male with hx of chronic Afib on Xarelto, PPM, HTN, HLD, BPH who had LHC performed with findings of multivessel CAD. CVTS consulted and performed CABGx4  on 9/11/2020 by Dr Saravia. Chest tubes and pacer wires removed 9/14/2020.     Last 24 hour Interval:  -S/P PRBC x 1  -Pt seen this afternoon and is sitting up in chair on RA. He reports he has ambulated the ICU.   -Had BM today                                                         Objective:  General Appearance:  In no acute distress and comfortable.    Vital signs: (most recent): Blood pressure 135/76, pulse 93, temperature 98.3 °F (36.8 °C), resp. rate (!) 75, height 5' 11" (1.803 m), weight 96.4 kg (212 lb 8.4 oz), SpO2 95 %.    Output: Producing urine.    Lungs:  Normal effort and normal respiratory rate.  He is not in respiratory distress.  (Mildly diminished BS)  Heart: Normal rate.  Regular rhythm.  No murmur.   Chest: Symmetric chest wall expansion. (Sternal incision and leg incision with dressing in place)  Abdomen: Abdomen is soft and non-distended.  Bowel sounds are normal.   There is no abdominal tenderness.     Pulses: Distal pulses are intact.    Neurological: Patient is alert and oriented to person, place and time.    Skin:  Warm and dry.      Recent Vitals:  Vitals:    09/15/20 0800 09/15/20 0901 09/15/20 1000 09/15/20 1101   BP: 115/66 (!) 142/70 135/76    BP Location:       Patient Position:       Pulse: 96 110 96 93   Resp: 17 (!) 23 13 (!) 75   Temp:    98.3 °F (36.8 °C)   TempSrc:       SpO2: 97% 98% 98% 95% "   Weight:       Height:           INPATIENT MEDS     amLODIPine  5 mg Oral Daily    aspirin  81 mg Oral Daily    atorvastatin  40 mg Oral Daily    chlorhexidine  15 mL Mouth/Throat BID    docusate sodium  100 mg Oral BID    enoxaparin  40 mg Subcutaneous Q24H    ezetimibe  10 mg Oral Daily    famotidine  20 mg Oral BID    metoprolol succinate  100 mg Oral Daily    mupirocin   Nasal BID    polyethylene glycol  17 g Oral Daily    tamsulosin  0.4 mg Oral Daily     sodium chloride, acetaminophen, albumin human 5%, calcium gluconate IVPB, calcium gluconate IVPB, calcium gluconate IVPB, cloNIDine, dextrose 50%, dextrose 50%, HYDROcodone-acetaminophen, HYDROcodone-acetaminophen, magnesium oxide, magnesium oxide, magnesium sulfate IVPB, magnesium sulfate IVPB, morphine, ondansetron, potassium chloride, potassium chloride, potassium, sodium phosphates, potassium, sodium phosphates, potassium, sodium phosphates, sodium phosphate IVPB, sodium phosphate IVPB, sodium phosphate IVPB  HEMODYNAMICS      Recent O2 Therapy/Vent Settings: RA    I/O last 24 hrs:  Intake/Output - Last 3 Shifts       09/13 0700 - 09/14 0659 09/14 0700 - 09/15 0659 09/15 0700 - 09/16 0659    P.O. 600 540 240    I.V. (mL/kg)       Blood  320     IV Piggyback 100 100     Total Intake(mL/kg) 700 (7.2) 960 (10) 240 (2.5)    Urine (mL/kg/hr) 990 (0.4) 1245 (0.5) 150 (0.3)    Stool  0 0    Chest Tube 150      Total Output 1140 1245 150    Net -440 -285 +90           Stool Occurrence  2 x 1 x        Recent Cardiac Rhythm: 100% v paced    Recent Pain Assessment: 0    CBC  Recent Labs   Lab 09/13/20 0400 09/14/20 0405 09/15/20  0350   WBC 14.24* 11.19 9.28   RBC 2.64* 2.43* 2.72*   HGB 8.3* 7.7* 8.3*   PLT 92* 112* 156   MCV 92 91 92   MCH 31.4* 31.7* 30.5   MCHC 34.2 34.7 33.1     BMP  Recent Labs   Lab 09/13/20  0400 09/14/20  0405 09/15/20  0350   CO2 24 26 25   BUN 24* 29* 30*   CREATININE 1.0 1.0 1.0   CALCIUM 8.3* 8.4* 8.4*     PT/INR  INR    Date Value Ref Range Status   09/11/2020 1.2  Final     Comment:     Coumadin Therapy:  INR: 2.0-3.0 conventional anticoagulation  INR: 2.5-3.5 intensive anticoagulation       DIAGNOSTIC RESULTS:  CXR and CT chest noted below    CURRENT CONSULTS:  IP CONSULT TO CARDIOTHORACIC SURGERY    ASSESSMENT/PLAN:  Multivessel CAD  S/P CABG x4 on 9/11/2020 by Dr Saravia  -Hemodynamically stable  -Tolerating RA  -Chest tubes and pacer wires have all been removed  -Continue ASA, BB, Zetia  -No leukocytosis  -Afebrile  -H/H stable, after PRBC, monitor  -Encourage IS  -Increase activity  -Hemodynamically stable for transfer out of ICU  -Hopefully home in am, cleared for DC from CVT standpoint if H/H remains stable and continues on current path    PPM in situ  Chronic Afib  -Cards attending  -Presently 100% V-paced at rate in mid 80's  -OK with oral AC if H/H remains stable-defer to cards    Transaminitis  -No abdominal pain  -Monitor    Case and plan of care discussed with MD  GI Prophylaxis: PPI:proton pump inhibitor per orders  DVT Prophylaxis:  Anticoagulants   Medication Route Frequency    enoxaparin injection 40 mg Subcutaneous Q24H       Elizabeth Davis PA-C  Cardiovascular Thoracic Surgery  9/15/2020  12:49 PM

## 2020-09-15 NOTE — PROGRESS NOTES
[facility] Medicine  Progress Note    Patient Name: Juwan Silver  MRN: [id number]  Patient Class: IP- Inpatient   Admission Date: 9/8/2020  Length of Stay: 7 days  Attending Physician: Marko Pike MD  Primary Care Provider: Primary Doctor Karen   Date of service: 9/15/2020    Subjective:     Principal Problem:Atherosclerosis of native coronary artery without angina pectoris    Interval History: POD#4 s/p CABGx4  Today the patient reports persistent pain of postoperative site of chest, soreness quality, constant timing, mild intensity, slowly improving with medical treatment.  He reports minimal shortness of breath which has improved; he is up to 1500 cc on incentive spirometer.  He is mobilizing better.  Denies fever or chills.  Eating well without nausea or vomiting.  Heart rate remains elevated above goal.  The patient is reporting some difficulty urinating, and he has not had Flomax for the last several days.    Scheduled Meds:   amLODIPine  5 mg Oral Daily    aspirin  81 mg Oral Daily    atorvastatin  40 mg Oral Daily    chlorhexidine  15 mL Mouth/Throat BID    docusate sodium  100 mg Oral BID    enoxaparin  40 mg Subcutaneous Q24H    ezetimibe  10 mg Oral Daily    famotidine  20 mg Oral BID    metoprolol succinate  100 mg Oral Daily    mupirocin   Nasal BID    polyethylene glycol  17 g Oral Daily     Continuous Infusions:    PRN Meds:sodium chloride, acetaminophen, albumin human 5%, calcium gluconate IVPB, calcium gluconate IVPB, calcium gluconate IVPB, cloNIDine, dextrose 50%, dextrose 50%, HYDROcodone-acetaminophen, HYDROcodone-acetaminophen, magnesium sulfate IVPB, magnesium sulfate IVPB, morphine, ondansetron, potassium chloride in water, potassium chloride in water, potassium chloride in water, sodium phosphate IVPB, sodium phosphate IVPB, sodium phosphate IVPB    Review of patient's allergies indicates:   Allergen Reactions    Nitroglycerin Other (See Comments)      Hyper sensitive     Objectives:     Vitals(Most Recent)      BP  Min: 107/67  Max: 143/85  Temp  Av.3 °F (36.8 °C)  Min: 97.7 °F (36.5 °C)  Max: 98.8 °F (37.1 °C)  Pulse  Av.9  Min: 92  Max: 122  Resp  Av.8  Min: 16  Max: 49  SpO2  Av.4 %  Min: 90 %  Max: 98 %  Weight  Av.4 kg (212 lb 8.4 oz)  Min: 96.4 kg (212 lb 8.4 oz)  Max: 96.4 kg (212 lb 8.4 oz)             Vitals([id number])  Temp:  [97.7 °F (36.5 °C)-98.8 °F (37.1 °C)]   Pulse:  []   Resp:  [16-49]   BP: (107-143)/(56-87)   SpO2:  [90 %-98 %]     I & O([id number])    Intake/Output Summary (Last 24 hours) at 9/15/2020 0807  Last data filed at 9/15/2020 0520  Gross per 24 hour   Intake 660 ml   Output 1245 ml   Net -585 ml       Physical Exam:   General:  Comfortable appearing, nontoxic, nondiaphoretic, sitting up in chair  ENT:  Moist mucous membranes, no thrush  Head and eyes:  Anicteric sclerae, no conjunctival discharge, PERRLA     CV: RRR.  2+ radial pulses bilaterally.  Postoperative site of chest intact without bleeding or drainage.  Pulmonary:  Comfortable work of breathing, lungs clear to auscultation bilaterally without crackles or wheezing  Abd:  Soft and nontender, nondistended, normal bowel sounds   Neuro: alert, oriented x 4, no focal deficits  Skin:  Dry and warm no jaundice  Psych:  Mood is calm, affect normal, insight good    LABS  CBC  Recent Labs   Lab 20  0400 20  0405 09/15/20  0350   WBC 14.24* 11.19 9.28   RBC 2.64* 2.43* 2.72*   HGB 8.3* 7.7* 8.3*   HCT 24.3* 22.2* 25.1*   PLT 92* 112* 156   MCV 92 91 92   MCH 31.4* 31.7* 30.5   MCHC 34.2 34.7 33.1       CMP  Recent Labs   Lab 20  0400 20  0405 09/15/20  0350    137 137   K 3.8 3.9 3.6   CO2 24 26 25    103 104   BUN 24* 29* 30*   CREATININE 1.0 1.0 1.0   * 114* 101     Recent Labs   Lab 20  1152  20  0400 20  0400 20  0405 09/15/20  0350   CALCIUM 7.4*   < > 7.8* 8.3* 8.4* 8.4*   MG 2.0   < >  2.3 2.2 2.0  --    PHOS 2.5*  --   --   --   --   --     < > = values in this interval not displayed.     Recent Labs   Lab 09/10/20  0512 09/11/20  0410 09/15/20  0350   PROT 6.3 6.6 5.7*   ALBUMIN 4.0 4.1 3.2*   BILITOT 1.4* 1.1* 1.5*   AST 28 29 107*   ALKPHOS 76 77 50*   ALT 31 35 62*         COAGS  Recent Labs   Lab 09/11/20  0410   INR 1.2   APTT 34.4*     Imaging;  Chest x-ray personally reviewed 9/15:  Lung fields are clear without focal consolidation or pulmonary edema, cardiac device in place left chest    Bedside telemetry reviewed    Assessment/Plan:      Active Hospital Problems    Diagnosis  POA    *Atherosclerosis of native coronary artery without angina pectoris [I25.10]  Yes    S/P CABG x 4 [Z95.1]  Not Applicable    CAD, multiple vessel [I25.10]  Yes    HTN (hypertension) [I10]  Yes    Chronic atrial fibrillation [I48.20]  Yes    Pacemaker [Z95.0]  Yes    Abnormal stress test [R94.39]  Yes   Multi-vessel CAD status post CABG x4  Acute blood loss anemia status post transfusion  Thrombocytopenia secondary to acute surgical blood loss, resolved  Postoperative hypoxemia, improving  Hypokalemia  Mild transaminitis  Paroxysmal atrial fibrillation  Hypertension  Hyperlipidemia  BPH    Plan:  S/p CABG x 4v on 9/11  [place]: dc'd   Chest tubes and pacers dc'd by CTS today 9/14  Routine post op care  OOB, IS  Cardiac Rehab consulted  Pt/ot consulted  C/w  ASA, BB, statin  Electrolyte replacement per sliding scale  Leukocytosis:resolved  Post-op anemia: will transfuse 1 unit pRBC  Step to cardiology   Gi ppx: pepcid  dvt ppx: oob,  Code: full    Plan update today:  I discussed the plan of care with Cardiology PA (Chris).   Continue care.  The patient is slowly improving.  Agree with transfer to telemetry unit today.  Chest tube/wires DCd 9/14.  Mild persistent hypoxemia is slowly improving.  Agree with CTA chest today to rule out postoperative pulmonary embolus.    Resume home Flomax  Hematocrit  stable status post 1 unit PRBC transfusion 9/14.  Continue serial labs.  Increase Toprol to 100 mg daily  Mobilize out of bed with PT/OT  Continue medical management including oral Lasix, statin, and aspirin  Monitor electrolytes and replace as needed including potassium.  GI prophylaxis with Pepcid  VTE prophylaxis with Lovenox  High risk secondary to severe exacerbation of chronic illness  Disposition:  Possible discharge home next 24-48 hours pending course    VTE Risk Mitigation (From admission, onward)         Ordered     enoxaparin injection 40 mg  Every 24 hours      09/14/20 1419                Pedro Blum MD  Department of Hospital Medicine   [facility]

## 2020-09-15 NOTE — PT/OT/SLP EVAL
Physical Therapy Evaluation    Patient Name:  Juwan Silver   MRN:  [id number]    Recommendations:     Discharge Recommendations:  (home, pt may or may not need home health)   Discharge Equipment Recommendations: (tbd)   Barriers to discharge: None    Assessment:     Juwan Silver is a 70 y.o. male admitted with a medical diagnosis of Atherosclerosis of native coronary artery without angina pectoris.  He presents with the following impairments/functional limitations:  weakness, impaired endurance, impaired self care skills, impaired functional mobilty, gait instability, impaired balance .    Pt standing in front of chair upon arrival trying to urinate, O2 3L 95%, /66 then after sitting 114/60. No dizziness reported. Resting O2 RA 97% and with ambulation 93% no sob, no dizziness. Pt agrees to PT/OT eval.    Rehab Prognosis: Good; patient would benefit from acute skilled PT services to address these deficits and reach maximum level of function.    Recent Surgery: Procedure(s) (LRB):  CORONARY ARTERY BYPASS GRAFT (CABG) (N/A) 4 Days Post-Op    Plan:     During this hospitalization, patient to be seen 5 x/week to address the identified rehab impairments via gait training, therapeutic activities, therapeutic exercises and progress toward the following goals:    · Plan of Care Expires:  10/15/20    Subjective     Chief Complaint: not being able to urinate, md just started flomax again.   Patient/Family Comments/goals: to get better  Pain/Comfort:  · Pain Rating 1: 0/10    Patients cultural, spiritual, [employer] conflicts given the current situation:      Living Environment:  Pt lives with wife, 2 story home, has bedroom on lower level and bath. No equipment, pt is retired .   Prior to admission, patients level of function was independent.  Equipment used at home: none.  DME owned (not currently used): none.  Upon discharge, patient will have assistance from wife.    Objective:     Communicated  with rn prior to session.  Patient found standing in front of chair. with blood pressure cuff, oxygen, peripheral IV, pulse ox (continuous), telemetry  upon PT entry to room.    General Precautions: Standard, fall, sternal   Orthopedic Precautions:N/A   Braces: N/A     Exams:  · Cognitive Exam:  Patient is oriented to Person, Place, Time and Situation  · Gross Motor Coordination:  WFL  · RLE ROM: WFL  · RLE Strength: WNL  · LLE ROM: WFL  · LLE Strength: WNL    Functional Mobility:  · Transfers:     · Sit to Stand:  contact guard assistance and and verbal cues for sternal precautions.  with rolling walker  · Gait: pt able to ambulate 285 ft RW around unit no O2 and sats 93%. Pt returned to bathroom for toilet transfer then to  to be taken to CT.  · Balance: Pt unsteady without use of RW at this time.     Therapeutic Activities and Exercises:   education, fall prevention, poc, dc planning, sternal precautions.     AM-PAC 6 CLICK MOBILITY  Total Score:18     Patient left in  with rn present.    GOALS:   Multidisciplinary Problems     Physical Therapy Goals        Problem: Physical Therapy Goal    Goal Priority Disciplines Outcome Goal Variances Interventions   Physical Therapy Goal     PT, PT/OT      Description: Goals to be met by: discharge     Patient will increase functional independence with mobility by performin. Supine to sit with Stand-by Assistance  2. Sit to stand transfer with Supervision  3. Bed to chair transfer with Supervision using Rolling Walker or no AD  4. Gait  x 300 feet with Supervision using Rolling Walker or no AD                     History:     Past Medical History:   Diagnosis Date    A-fib     BPH (benign prostatic hyperplasia)     Hernia     Hyperlipidemia     Hypertension        Past Surgical History:   Procedure Laterality Date    ABLATION OF ARRHYTHMOGENIC FOCUS FOR ATRIAL FIBRILLATION      ANGIOGRAM, CORONARY, WITH LEFT HEART CATHETERIZATION Left 2020    Procedure:  ANGIOGRAM,CORONARY,WITH LEFT HEART CATHETERIZATION;  Surgeon: Marko Pike MD;  Location: [facility] CATH/EP LAB;  Service: Cardiology;  Laterality: Left;    cholesterol      CORONARY ARTERY BYPASS GRAFT (CABG) N/A 9/11/2020    Procedure: CORONARY ARTERY BYPASS GRAFT (CABG);  Surgeon: Blu Saravia MD;  Location: [facility] OR;  Service: Cardiothoracic;  Laterality: N/A;    HERNIA REPAIR      hypertension         Time Tracking:     PT Received On: 09/15/20  PT Start Time: 0850     PT Stop Time: 0919  PT Total Time (min): 29 min     Billable Minutes: Evaluation 21 and Gait Training 8; co-daniel Monge, PT  09/15/2020

## 2020-09-15 NOTE — NURSING TRANSFER
Nursing Transfer Note      9/15/2020      Transfer 2537  Transfer via wheelchair    Transfer with   O2, cardiac monitoring    Transported by tcb    Medicines sent: no    Chart send with patient: Yes    Notified: no    Patient reassessed at: 9/15/20 1530    Upon arrival to floor: cardiac monitor applied, patient oriented to room, call bell in reach and bed in lowest position

## 2020-09-15 NOTE — PLAN OF CARE
Ambulated with pt. Bowel movements X 2, soft brown. Ct of chest shows no PE. Sitting up in chair at bedside doing IS. Denies pain at this time. Continues to be 100% paced.Vital signs WNL.

## 2020-09-16 LAB
ANION GAP SERPL CALC-SCNC: 8 MMOL/L (ref 8–16)
BUN SERPL-MCNC: 28 MG/DL (ref 8–23)
CALCIUM SERPL-MCNC: 8.4 MG/DL (ref 8.7–10.5)
CHLORIDE SERPL-SCNC: 103 MMOL/L (ref 95–110)
CO2 SERPL-SCNC: 27 MMOL/L (ref 23–29)
CREAT SERPL-MCNC: 0.9 MG/DL (ref 0.5–1.4)
ERYTHROCYTE [DISTWIDTH] IN BLOOD BY AUTOMATED COUNT: 13.9 % (ref 11.5–14.5)
EST. GFR  (AFRICAN AMERICAN): >60 ML/MIN/1.73 M^2
EST. GFR  (NON AFRICAN AMERICAN): >60 ML/MIN/1.73 M^2
GLUCOSE SERPL-MCNC: 108 MG/DL (ref 70–110)
HCT VFR BLD AUTO: 23.5 % (ref 40–54)
HGB BLD-MCNC: 7.9 G/DL (ref 14–18)
MAGNESIUM SERPL-MCNC: 2.1 MG/DL (ref 1.6–2.6)
MCH RBC QN AUTO: 31 PG (ref 27–31)
MCHC RBC AUTO-ENTMCNC: 33.6 G/DL (ref 32–36)
MCV RBC AUTO: 92 FL (ref 82–98)
PLATELET # BLD AUTO: 170 K/UL (ref 150–350)
PMV BLD AUTO: 10 FL (ref 9.2–12.9)
POTASSIUM SERPL-SCNC: 3.4 MMOL/L (ref 3.5–5.1)
RBC # BLD AUTO: 2.55 M/UL (ref 4.6–6.2)
SODIUM SERPL-SCNC: 138 MMOL/L (ref 136–145)
WBC # BLD AUTO: 8.46 K/UL (ref 3.9–12.7)

## 2020-09-16 PROCEDURE — 25000003 PHARM REV CODE 250: Performed by: INTERNAL MEDICINE

## 2020-09-16 PROCEDURE — 97116 GAIT TRAINING THERAPY: CPT | Mod: CQ

## 2020-09-16 PROCEDURE — 99900035 HC TECH TIME PER 15 MIN (STAT)

## 2020-09-16 PROCEDURE — 85027 COMPLETE CBC AUTOMATED: CPT

## 2020-09-16 PROCEDURE — 80048 BASIC METABOLIC PNL TOTAL CA: CPT

## 2020-09-16 PROCEDURE — 94761 N-INVAS EAR/PLS OXIMETRY MLT: CPT

## 2020-09-16 PROCEDURE — 25000003 PHARM REV CODE 250: Performed by: STUDENT IN AN ORGANIZED HEALTH CARE EDUCATION/TRAINING PROGRAM

## 2020-09-16 PROCEDURE — 21000000 HC CCU ICU ROOM CHARGE

## 2020-09-16 PROCEDURE — 63600175 PHARM REV CODE 636 W HCPCS: Performed by: INTERNAL MEDICINE

## 2020-09-16 PROCEDURE — 83735 ASSAY OF MAGNESIUM: CPT

## 2020-09-16 PROCEDURE — 97535 SELF CARE MNGMENT TRAINING: CPT

## 2020-09-16 RX ADMIN — METOPROLOL SUCCINATE 100 MG: 50 TABLET, FILM COATED, EXTENDED RELEASE ORAL at 08:09

## 2020-09-16 RX ADMIN — ASPIRIN 81 MG: 81 TABLET, DELAYED RELEASE ORAL at 08:09

## 2020-09-16 RX ADMIN — FAMOTIDINE 20 MG: 20 TABLET ORAL at 08:09

## 2020-09-16 RX ADMIN — EZETIMIBE 10 MG: 10 TABLET ORAL at 08:09

## 2020-09-16 RX ADMIN — ENOXAPARIN SODIUM 40 MG: 100 INJECTION SUBCUTANEOUS at 04:09

## 2020-09-16 RX ADMIN — ATORVASTATIN CALCIUM 40 MG: 40 TABLET, FILM COATED ORAL at 08:09

## 2020-09-16 RX ADMIN — TAMSULOSIN HYDROCHLORIDE 0.4 MG: 0.4 CAPSULE ORAL at 08:09

## 2020-09-16 RX ADMIN — AMLODIPINE BESYLATE 5 MG: 5 TABLET ORAL at 08:09

## 2020-09-16 RX ADMIN — DOCUSATE SODIUM 100 MG: 100 CAPSULE, LIQUID FILLED ORAL at 08:09

## 2020-09-16 NOTE — PT/OT/SLP PROGRESS
Physical Therapy Treatment    Patient Name:  Juwan Silver   MRN:  [id number]    Recommendations:     Discharge Recommendations:  home health PT   Discharge Equipment Recommendations: walker, rolling   Barriers to discharge: None    Assessment:     Juwan Silver is a 70 y.o. male admitted with a medical diagnosis of Atherosclerosis of native coronary artery without angina pectoris.  He presents with the following impairments/functional limitations:  weakness, impaired endurance, impaired self care skills, impaired functional mobilty, gait instability, impaired balance, impaired cardiopulmonary response to activity. Patient presents sitting up in chair; agreeable to PT treatment. Patient progressing with gait and mobility without LOB or SOB noted. Patient does require some verbal cueing to maintain sternal precautions with bed mobility. Pt's HR noted to increase to 120 with ambulation. Continue with PT and POC.    Rehab Prognosis: Good; patient would benefit from acute skilled PT services to address these deficits and reach maximum level of function.    Recent Surgery: Procedure(s) (LRB):  CORONARY ARTERY BYPASS GRAFT (CABG) (N/A) 5 Days Post-Op    Plan:     During this hospitalization, patient to be seen 5 x/week to address the identified rehab impairments via gait training, therapeutic activities, therapeutic exercises and progress toward the following goals:    · Plan of Care Expires:  10/15/20    Subjective     Chief Complaint: Ready to go home  Patient/Family Comments/goals: to go home  Pain/Comfort:  · Pain Rating 1: 0/10      Objective:     Communicated with nurse prior to session.  Patient found up in chair with telemetry, pulse ox (continuous) upon PT entry to room.     General Precautions: Standard, fall, sternal   Orthopedic Precautions:N/A   Braces:       Functional Mobility:  · Bed Mobility:     · Sit to Supine: minimum assistance with verbal cues for patient to maintain sternal  precautions  · Transfers:     · Sit to Stand:  contact guard assistance with rolling walker; using heart pillow to perform rocking technique to maintain sternal precautions  · Chair to Bed: contact guard assistance with  rolling walker  using  Step Transfer  · Gait: 210ft with RW and CGA      AM-PAC 6 CLICK MOBILITY          Therapeutic Activities and Exercises:   bed mobility; sitting EOB for trunk control and midline orientation; sit <> stands; transfer training; sternal precaution education    Patient left HOB elevated with all lines intact and call button in reach..    GOALS:   Multidisciplinary Problems     Physical Therapy Goals        Problem: Physical Therapy Goal    Goal Priority Disciplines Outcome Goal Variances Interventions   Physical Therapy Goal     PT, PT/OT Ongoing, Progressing     Description: Goals to be met by: discharge     Patient will increase functional independence with mobility by performin. Supine to sit with Stand-by Assistance  2. Sit to stand transfer with Supervision  3. Bed to chair transfer with Supervision using Rolling Walker or no AD  4. Gait  x 300 feet with Supervision using Rolling Walker or no AD                     Time Tracking:     PT Received On: 20  PT Start Time: 1010     PT Stop Time: 1022  PT Total Time (min): 12 min     Billable Minutes: Gait Training 12    Treatment Type: Treatment  PT/PTA: PTA     PTA Visit Number: 1     Yessenia Valdez PTA  2020

## 2020-09-16 NOTE — PT/OT/SLP PROGRESS
Occupational Therapy   Treatment/Discharge    Name: Juwan Silver  MRN: [id number]  Admitting Diagnosis:  Atherosclerosis of native coronary artery without angina pectoris  5 Days Post-Op    Recommendations:     Discharge Recommendations: home  Discharge Equipment Recommendations:  none  Barriers to discharge:  None    Assessment:     Juwan Silver is a 70 y.o. male with a medical diagnosis of Atherosclerosis of native coronary artery without angina pectoris.  He presents with improved medical acuity and functional mobility. Patient participated in sitting/standing ADL activity while maintaining sternal precautions. Performance deficits affecting function are weakness, impaired endurance, impaired self care skills, impaired functional mobilty, gait instability, impaired balance, impaired cardiopulmonary response to activity.     Rehab Prognosis:  Fair; patient would benefit from acute skilled OT services to address these deficits and reach maximum level of function.       Plan:     Patient to be seen 5 x/week to address the above listed problems via self-care/home management, therapeutic activities, therapeutic exercises  · Plan of Care Expires: 10/15/20  · Plan of Care Reviewed with: patient    Subjective     Pain/Comfort:  · Pain Rating 1: 0/10  · Pain Rating Post-Intervention 1: 0/10    Objective:     Communicated with: nurse prior to session.  Patient found HOB elevated with telemetry, peripheral IV upon OT entry to room.    General Precautions: Standard, sternal(None)   Orthopedic Precautions:N/A   Braces: N/A     Occupational Performance:     Bed Mobility:    · Patient completed Scooting/Bridging with supervision  · Patient completed Supine to Sit with supervision  · Patient completed Sit to Supine with supervision     Functional Mobility/Transfers:  · Patient completed Sit <> Stand Transfer with supervision  with  rolling walker     Activities of Daily Living:  · Grooming: supervision to wash face  sitting EOB.  · Upper Body Dressing: supervision to don/doff hospital gown worn as a robe sitting EOB.  · Lower Body Dressing: supervision to don/doff socks sitting EOB.      [facility] 6 Click ADL: 24    Treatment & Education:  Patient demonstrated appropriate sternal precautions while performing ADL activity.    Patient left HOB elevated with all lines intact and call button in reachEducation:      GOALS:   Multidisciplinary Problems     Occupational Therapy Goals     Not on file          Multidisciplinary Problems (Resolved)        Problem: Occupational Therapy Goal    Goal Priority Disciplines Outcome Interventions   Occupational Therapy Goal   (Resolved)     OT, PT/OT Met    Description: Goals to be met by: dicscharge     Patient will increase functional independence with ADLs by performing:    UE Dressing with Supervision.  LE Dressing with Supervision.  Grooming while standing at sink with Supervision.  Toileting from toilet with Supervision for hygiene and clothing management.   Toilet transfer to toilet with Supervision.  Perform sitting/standing ADL activity with no verbal cues to maintain sternal precautions.                     Time Tracking:     OT Date of Treatment: 09/16/20  OT Start Time: 1045  OT Stop Time: 1056  OT Total Time (min): 11 min    Billable Minutes:Self Care/Home Management 11    Samson Rodriguez OT  9/16/2020

## 2020-09-16 NOTE — RESPIRATORY THERAPY
This note also relates to the following rows which could not be included:  SpO2 - Cannot attach notes to unvalidated device data  Pulse - Cannot attach notes to unvalidated device data       09/15/20 2038   Patient Assessment/Suction   Level of Consciousness (AVPU) alert   Respiratory Effort Unlabored   Expansion/Accessory Muscles/Retractions accessory muscle use   All Lung Fields Breath Sounds diminished;clear   RLL Breath Sounds diminished   Cough Type good;nonproductive;splinted   PRE-TX-O2   O2 Device (Oxygen Therapy) nasal cannula   Flow (L/min) 0   Pulse Oximetry Type Continuous   $ Pulse Oximetry - Multiple Charge Pulse Oximetry - Multiple   Resp 20   Respiratory Interventions   Cough And Deep Breathing done with encouragement   Breathing Techniques/Airway Clearance deep/controlled cough encouraged;diaphragmatic breathing promoted   Education   $ Education Other (see comment);15 min  (o2,pox,IS,deep diaphragmatic breathing exercises)   Respiratory Evaluation   $ Care Plan Tech Time 15 min   Evaluation For   (care plan)        09/15/20 2038   Patient Assessment/Suction   Level of Consciousness (AVPU) alert   Respiratory Effort Unlabored   Expansion/Accessory Muscles/Retractions accessory muscle use   All Lung Fields Breath Sounds diminished;clear   RLL Breath Sounds diminished   Cough Type good;nonproductive;splinted   PRE-TX-O2   O2 Device (Oxygen Therapy) nasal cannula   Flow (L/min) 0   Pulse Oximetry Type Continuous   $ Pulse Oximetry - Multiple Charge Pulse Oximetry - Multiple   Resp 20   Respiratory Interventions   Cough And Deep Breathing done with encouragement   Breathing Techniques/Airway Clearance deep/controlled cough encouraged;diaphragmatic breathing promoted   Education   $ Education Other (see comment);15 min  (o2,pox,IS,deep diaphragmatic breathing exercises)   Respiratory Evaluation   $ Care Plan Tech Time 15 min   Evaluation For   (care plan)

## 2020-09-16 NOTE — PLAN OF CARE
Problem: Physical Therapy Goal  Goal: Physical Therapy Goal  Description: Goals to be met by: discharge     Patient will increase functional independence with mobility by performin. Supine to sit with Stand-by Assistance  2. Sit to stand transfer with Supervision  3. Bed to chair transfer with Supervision using Rolling Walker or no AD  4. Gait  x 300 feet with Supervision using Rolling Walker or no AD    Outcome: Ongoing, Progressing   Pt continues to progress towards goals.

## 2020-09-16 NOTE — PLAN OF CARE
This note also relates to the following rows which could not be included:  SpO2 - Cannot attach notes to unvalidated device data  Pulse - Cannot attach notes to unvalidated device data       09/16/20 0837   Patient Assessment/Suction   Level of Consciousness (AVPU) alert   All Lung Fields Breath Sounds clear   PRE-TX-O2   O2 Device (Oxygen Therapy) room air   Pulse Oximetry Type Continuous   $ Pulse Oximetry - Multiple Charge Pulse Oximetry - Multiple   Resp 15   Aerosol Therapy   $ Aerosol Therapy Charges PRN treatment not required   Respiratory Evaluation   $ Care Plan Tech Time 15 min

## 2020-09-16 NOTE — PROGRESS NOTES
[facility]    Cardiology Progress Note    Subjective: Patient seen and examined this AM. CTA negative for PE, subcutaneous emphysema noted within the left anterior chest wall consistent with recent surgery with small bilateral pleural effusions. Hemoglobin 7.9 today. Remains tachycardic in pacing rhythm with vitals stable otherwise. Oxygenating well and ambulating without difficulty. Feels well without any cardiac complaints.     Objective:  Vital Signs (Most Recent)  Temp: 98.1 °F (36.7 °C) (09/16/20 0802)  Pulse: 105 (09/16/20 0837)  Resp: 15 (09/16/20 0837)  BP: 133/75 (09/16/20 0802)  SpO2: 97 % (09/16/20 0837)    Vital Signs Range (Last 24H):  Temp:  [97.9 °F (36.6 °C)-98.5 °F (36.9 °C)]   Pulse:  []   Resp:  [13-75]   BP: (113-168)/(64-84)   SpO2:  [95 %-98 %]     I & O (Last 24H):    Intake/Output Summary (Last 24 hours) at 9/16/2020 0840  Last data filed at 9/16/2020 0600  Gross per 24 hour   Intake 240 ml   Output 752 ml   Net -512 ml       Current Diet:     Current Diet Order   Procedures    Diet Cardiac        Allergies:  Review of patient's allergies indicates:   Allergen Reactions    Nitroglycerin Other (See Comments)     Hyper sensitive       Meds:  Scheduled Meds:   amLODIPine  5 mg Oral Daily    aspirin  81 mg Oral Daily    atorvastatin  40 mg Oral Daily    chlorhexidine  15 mL Mouth/Throat BID    docusate sodium  100 mg Oral BID    enoxaparin  40 mg Subcutaneous Q24H    ezetimibe  10 mg Oral Daily    famotidine  20 mg Oral BID    metoprolol succinate  100 mg Oral Daily    mupirocin   Nasal BID    polyethylene glycol  17 g Oral Daily    tamsulosin  0.4 mg Oral Daily     Continuous Infusions:    PRN Meds:sodium chloride, acetaminophen, albumin human 5%, calcium gluconate IVPB, calcium gluconate IVPB, calcium gluconate IVPB, cloNIDine, dextrose 50%, dextrose 50%, HYDROcodone-acetaminophen, HYDROcodone-acetaminophen, magnesium oxide, magnesium oxide, magnesium sulfate  "IVPB, magnesium sulfate IVPB, morphine, ondansetron, potassium chloride, potassium chloride, potassium, sodium phosphates, potassium, sodium phosphates, potassium, sodium phosphates, sodium phosphate IVPB, sodium phosphate IVPB, sodium phosphate IVPB    Lab Results :  Recent Results (from the past 24 hour(s))   Basic metabolic panel    Collection Time: 09/16/20  4:30 AM   Result Value Ref Range    Sodium 138 136 - 145 mmol/L    Potassium 3.4 (L) 3.5 - 5.1 mmol/L    Chloride 103 95 - 110 mmol/L    CO2 27 23 - 29 mmol/L    Glucose 108 70 - 110 mg/dL    BUN, Bld 28 (H) 8 - 23 mg/dL    Creatinine 0.9 0.5 - 1.4 mg/dL    Calcium 8.4 (L) 8.7 - 10.5 mg/dL    Anion Gap 8 8 - 16 mmol/L    eGFR if African American >60.0 >60 mL/min/1.73 m^2    eGFR if non African American >60.0 >60 mL/min/1.73 m^2   CBC Without Differential    Collection Time: 09/16/20  4:30 AM   Result Value Ref Range    WBC 8.46 3.90 - 12.70 K/uL    RBC 2.55 (L) 4.60 - 6.20 M/uL    Hemoglobin 7.9 (L) 14.0 - 18.0 g/dL    Hematocrit 23.5 (L) 40.0 - 54.0 %    Mean Corpuscular Volume 92 82 - 98 fL    Mean Corpuscular Hemoglobin 31.0 27.0 - 31.0 pg    Mean Corpuscular Hemoglobin Conc 33.6 32.0 - 36.0 g/dL    RDW 13.9 11.5 - 14.5 %    Platelets 170 150 - 350 K/uL    MPV 10.0 9.2 - 12.9 fL   Magnesium    Collection Time: 09/16/20  4:30 AM   Result Value Ref Range    Magnesium 2.1 1.6 - 2.6 mg/dL         Physical Exam   Objective:  General Appearance:  Comfortable.    Vital signs: (most recent): Blood pressure 133/75, pulse 105, temperature 98.1 °F (36.7 °C), temperature source Oral, resp. rate 15, height 5' 11" (1.803 m), weight 95.7 kg (210 lb 15.7 oz), SpO2 97 %.    Lungs:  Normal effort and normal respiratory rate.  Breath sounds clear to auscultation.  (Chest tubes )  Heart: Tachycardia.  Regular rhythm.  S1 normal and S2 normal.  No gallop or friction rub.   Abdomen: Abdomen is soft.  Bowel sounds are normal.   There is no abdominal tenderness.     Extremities: " Normal range of motion.    Neurological: Patient is alert and oriented to person, place and time.        Current Consults:  IP CONSULT TO CARDIOTHORACIC SURGERY    Assessment/Plan:  Assessment:   1.CAD/LM disease - s/p CABG x4  2. History of paroxysmal atrial fibrillation  3. Status post multiple ablations  4. History of DDD pacemaker  5. Hypertension  6. Hyperlipidemia  7. BPH  8. Tachycardia    Plan:   In light of patient's continued tachycardia with no evidence PE under significant beta blockade with persistent anemia: Consider 2U pRBC transfusion today but in the least patient will need 1 unit transfused - 1 unit was given two days ago but did not provide significant boost to patient's hemoglobin. If two units are given, would recommend additional 20 mg IV Lasix injection with transfusion. Defer to hospital medicine for orders.   Continue Toprol at current dosing. Will additionally interrogate [employer] PPM device for continued tachycardic pacing.   Continue Lovenox, Lasix, cardiac medications otherwise as scheduled.  Continue OOB/IS/Cardiac rehab.  Consider one further night observational stay to better mediate the patient's tachycardia and anemia although if patient responds well following transfusion, consider discharge later today.     Chris Dubon PA-C  09/16/2020

## 2020-09-16 NOTE — PROGRESS NOTES
"Formerly [facility]  Adult Nutrition   Progress Note (Follow-Up)    SUMMARY     Recommendations/Interventions:    Recommendation/Intervention: 1. Continue current diet as tolerated, encourage intake. 2.  to assist in meal choices daily.  Goals: 1. Patient to meet at least 75% of estimated energy and protein needs. 2. Patient to express understanding of diet recommendations and lifestyle changes. RD to answer diet related questions should they arise.-met  Nutrition Goal Status: progressing towards goal    Dietitian Rounds Brief:  · Seen 2' f/u. Appetite and intake fair. Still does not want a nutrition supplement. No issues with N/V/D. Patient just wants to go home. Per cardiology: "In light of patient's continued tachycardia with no evidence PE under significant beta blockade with persistent anemia: Consider 2U pRBC transfusion today but in the least patient will need 1 unit transfused - 1 unit was given two days ago but did not provide significant boost to patient's hemoglobin" Possible discharge later today if tachycardia and anemia improved. Will continue to monitor intake, labs, and plan of care.    Reason for Assessment  Reason For Assessment: RD follow-up  Diagnosis: (Atherosclerosis of native coronary artery without angina pectoris)  Relevant Medical History: A-Fib, BPH, HLD, HTN  Interdisciplinary Rounds: did not attend    Nutrition Risk Screen  Nutrition Risk Screen: no indicators present     MST Score: 0  Have you recently lost weight without trying?: No  Weight loss score: 0  Have you been eating poorly because of a decreased appetite?: No  Appetite score: 0     Nutrition/Diet History  Spiritual, Cultural Beliefs, Pent[employer]al Practices, Values that Affect Care: no  Food Allergies: NKFA  Factors Affecting Nutritional Intake: None identified at this time    Anthropometrics  Temp: 98.1 °F (36.7 °C)  Height Method: Stated  Height: 5' 11" (180.3 cm)  Height (inches): 71 in  Weight Method: Standard " Scale  Weight: 95.7 kg (210 lb 15.7 oz)  Weight (lb): 210.98 lb  Ideal Body Weight (IBW), Male: 172 lb  % Ideal Body Weight, Male (lb): 115.23 %  BMI (Calculated): 29.4  BMI Grade: 25 - 29.9 - overweight     Weight History:  Wt Readings from Last 10 Encounters:   09/16/20 95.7 kg (210 lb 15.7 oz)   12/22/14 86.2 kg (190 lb)   01/06/14 88.9 kg (196 lb)   03/20/13 84.4 kg (186 lb)     Lab/Procedures/Meds: Pertinent Labs Reviewed  Clinical Chemistry:  Recent Labs   Lab 09/11/20  1152 09/15/20  0350 09/16/20  0430    137 138   K 3.9  3.9 3.6 3.4*   * 104 103   CO2 21* 25 27    101 108   BUN 17 30* 28*   CREATININE 0.9 1.0 0.9   CALCIUM 7.4* 8.4* 8.4*   PROT  --  5.7*  --    ALBUMIN  --  3.2*  --    BILITOT  --  1.5*  --    ALKPHOS  --  50*  --    AST  --  107*  --    ALT  --  62*  --    ANIONGAP 5* 8 8   ESTGFRAFRICA >60.0 >60.0 >60.0   EGFRNONAA >60.0 >60.0 >60.0   MG 2.0  --  2.1   PHOS 2.5*  --   --     < > = values in this interval not displayed.     CBC:   Recent Labs   Lab 09/16/20  0430   WBC 8.46   RBC 2.55*   HGB 7.9*   HCT 23.5*      MCV 92   MCH 31.0   MCHC 33.6     Medications: Pertinent Medications reviewed  Scheduled Meds:   amLODIPine  5 mg Oral Daily    aspirin  81 mg Oral Daily    atorvastatin  40 mg Oral Daily    docusate sodium  100 mg Oral BID    enoxaparin  40 mg Subcutaneous Q24H    ezetimibe  10 mg Oral Daily    famotidine  20 mg Oral BID    metoprolol succinate  100 mg Oral Daily    polyethylene glycol  17 g Oral Daily    tamsulosin  0.4 mg Oral Daily     Continuous Infusions:  PRN Meds:.sodium chloride, acetaminophen, albumin human 5%, calcium gluconate IVPB, calcium gluconate IVPB, calcium gluconate IVPB, cloNIDine, dextrose 50%, dextrose 50%, HYDROcodone-acetaminophen, HYDROcodone-acetaminophen, magnesium oxide, magnesium oxide, magnesium sulfate IVPB, magnesium sulfate IVPB, morphine, ondansetron, potassium chloride, potassium chloride, potassium, sodium  phosphates, potassium, sodium phosphates, potassium, sodium phosphates, sodium phosphate IVPB, sodium phosphate IVPB, sodium phosphate IVPB    Antibiotics (From admission, onward)    Start     Stop Route Frequency Ordered    09/11/20 0945  mupirocin 2 % ointment  (MRSA Decolonization Orders [facility])      09/16 0859 Nasl 2 times daily 09/11/20 0832        Estimated/Assessed Needs    Weight Used For Calorie Calculations: 89.9 kg (198 lb 3.1 oz)  Energy Calorie Requirements (kcal): 8741-3877 kcals/day (20-25 kcals/kg)  Energy Need Method: Kcal/kg  Protein Requirements: 108-135 g/day (1.2-1.5 g/kg)  Weight Used For Protein Calculations: 89.9 kg (198 lb 3.1 oz)     Estimated Fluid Requirement Method: RDA Method    Nutrition Prescription Ordered    Current Diet Order: NPO    Evaluation of Received Nutrient/Fluid Intake    Energy Calories Required: not meeting needs  Protein Required: not meeting needs  Fluid Required: meeting needs  Tolerance: tolerating  % Intake of Estimated Energy Needs: 25 - 50 %  % Meal Intake: 25 - 50 %    Intake/Output Summary (Last 24 hours) at 9/16/2020 0944  Last data filed at 9/16/2020 0600  Gross per 24 hour   Intake 240 ml   Output 752 ml   Net -512 ml      Nutrition Risk    Level of Risk/Frequency of Follow-up: moderate   Monitor and Evaluation    Food and Nutrient Intake: energy intake, food and beverage intake  Food and Nutrient Adminstration: diet order  Knowledge/Beliefs/Attitudes: beliefs and attitudes, food and nutrition knowledge/skill  Physical Activity and Function: nutrition-related ADLs and IADLs, factors affecting access to physical activity  Anthropometric Measurements: weight, weight change, body mass index  Biochemical Data, Medical Tests and Procedures: electrolyte and renal panel, inflammatory profile, gastrointestinal profile, lipid profile, glucose/endocrine profile  Nutrition-Focused Physical Findings: overall appearance     Nutrition Follow-Up    RD Follow-up?: Yes  Dayanna  LOGAN Amador 09/16/2020 11:17 AM

## 2020-09-16 NOTE — PLAN OF CARE
Problem: Occupational Therapy Goal  Goal: Occupational Therapy Goal  Description: Goals to be met by: dicscharge     Patient will increase functional independence with ADLs by performing:    UE Dressing with Supervision.  LE Dressing with Supervision.  Grooming while standing at sink with Supervision.  Toileting from toilet with Supervision for hygiene and clothing management.   Toilet transfer to toilet with Supervision.  Perform sitting/standing ADL activity with no verbal cues to maintain sternal precautions.    Outcome: Met

## 2020-09-16 NOTE — PROGRESS NOTES
[facility] Medicine  Progress Note    Patient Name: Juwan Silver  MRN: [id number]  Patient Class: IP- Inpatient   Admission Date: 2020  Attending Physician: Marko Pike MD  Primary Care Provider: Primary Doctor No   Date of service: 2020    Subjective:     Principal Problem:Atherosclerosis of native coronary artery without angina pectoris    Interval History: POD#5 s/p CABGx4  Today the patient reports postoperative site chest soreness continues to improve, controlled with pain medicines, mild intensity, slowly improving with medical treatment.  Denies any shortness of breath today.  He is ambulating in the room without difficulty.  No lightheadedness or dizziness.  Denies fever or chills.  Eating well without nausea or vomiting.  Still tachycardic.      Scheduled Meds:   amLODIPine  5 mg Oral Daily    aspirin  81 mg Oral Daily    atorvastatin  40 mg Oral Daily    docusate sodium  100 mg Oral BID    enoxaparin  40 mg Subcutaneous Q24H    ezetimibe  10 mg Oral Daily    famotidine  20 mg Oral BID    metoprolol succinate  100 mg Oral Daily    polyethylene glycol  17 g Oral Daily    tamsulosin  0.4 mg Oral Daily     Continuous Infusions:    PRN Meds:sodium chloride, acetaminophen, albumin human 5%, calcium gluconate IVPB, calcium gluconate IVPB, calcium gluconate IVPB, cloNIDine, dextrose 50%, dextrose 50%, HYDROcodone-acetaminophen, HYDROcodone-acetaminophen, magnesium oxide, magnesium oxide, magnesium sulfate IVPB, magnesium sulfate IVPB, morphine, ondansetron, potassium chloride, potassium chloride, potassium, sodium phosphates, potassium, sodium phosphates, potassium, sodium phosphates, sodium phosphate IVPB, sodium phosphate IVPB, sodium phosphate IVPB    Review of patient's allergies indicates:   Allergen Reactions    Nitroglycerin Other (See Comments)     Hyper sensitive     Objectives:     Vitals(Most Recent)      BP  Min: 118/68  Max: 168/84  Temp  Av.4 °F  (36.9 °C)  Min: 98.1 °F (36.7 °C)  Max: 98.5 °F (36.9 °C)  Pulse  Av.8  Min: 93  Max: 111  Resp  Av.7  Min: 15  Max: 21  SpO2  Av.3 %  Min: 97 %  Max: 98 %  Weight  Av.7 kg (210 lb 15.7 oz)  Min: 95.7 kg (210 lb 15.7 oz)  Max: 95.7 kg (210 lb 15.7 oz)             Vitals([id number])  Temp:  [98.1 °F (36.7 °C)-98.5 °F (36.9 °C)]   Pulse:  []   Resp:  [15-21]   BP: (118-168)/(68-84)   SpO2:  [97 %-98 %]     I & O([id number])    Intake/Output Summary (Last 24 hours) at 2020 1549  Last data filed at 2020 0600  Gross per 24 hour   Intake --   Output 650 ml   Net -650 ml       Physical Exam:   General:  Comfortable appearing, nontoxic, nondiaphoretic, ambulating in room comfortably  ENT:  Moist mucous membranes, no thrush  Head and eyes:  Anicteric sclerae, no conjunctival discharge, PERRLA     CV: RRR.  2+ radial pulses bilaterally.  Postoperative site of chest intact without bleeding or drainage.  Pulmonary:  Comfortable work of breathing, lungs clear to auscultation bilaterally without crackles or wheezing  Abd:  Soft and nontender, nondistended, normal bowel sounds   Neuro: alert, oriented x 4, no focal deficits  Skin:  Dry and warm no jaundice  Psych:  Mood is calm, affect normal, insight good, in good spirits    LABS  CBC  Recent Labs   Lab 20  0405 09/15/20  0350 20  0430   WBC 11.19 9.28 8.46   RBC 2.43* 2.72* 2.55*   HGB 7.7* 8.3* 7.9*   HCT 22.2* 25.1* 23.5*   * 156 170   MCV 91 92 92   MCH 31.7* 30.5 31.0   MCHC 34.7 33.1 33.6       CMP  Recent Labs   Lab 20  0405 09/15/20  03520  0430    137 138   K 3.9 3.6 3.4*   CO2 26 25 27    104 103   BUN 29* 30* 28*   CREATININE 1.0 1.0 0.9   * 101 108     Recent Labs   Lab 20  1152  20  0400 20  0405 09/15/20  03520  0430   CALCIUM 7.4*   < > 8.3* 8.4* 8.4* 8.4*   MG 2.0   < > 2.2 2.0  --  2.1   PHOS 2.5*  --   --   --   --   --     < > = values in this interval  not displayed.     Recent Labs   Lab 09/10/20  0512 09/11/20  0410 09/15/20  0350   PROT 6.3 6.6 5.7*   ALBUMIN 4.0 4.1 3.2*   BILITOT 1.4* 1.1* 1.5*   AST 28 29 107*   ALKPHOS 76 77 50*   ALT 31 35 62*         COAGS  Recent Labs   Lab 09/11/20  0410   INR 1.2   APTT 34.4*     Imaging;  Chest x-ray personally reviewed 9/16:  Lung fields are clear without focal consolidation or pulmonary edema, cardiac device in place left chest; post sternotomy; stable unchanged from previous    Bedside telemetry reviewed    Assessment/Plan:      Active Hospital Problems    Diagnosis  POA    *Atherosclerosis of native coronary artery without angina pectoris [I25.10]  Yes    S/P CABG x 4 [Z95.1]  Not Applicable    CAD, multiple vessel [I25.10]  Yes    HTN (hypertension) [I10]  Yes    Chronic atrial fibrillation [I48.20]  Yes    Pacemaker [Z95.0]  Yes    Abnormal stress test [R94.39]  Yes   Multi-vessel CAD status post CABG x4  Acute blood loss anemia status post transfusion  Thrombocytopenia secondary to acute surgical blood loss, resolved  Postoperative hypoxemia, improving  Hypokalemia  Mild transaminitis  Paroxysmal atrial fibrillation  Hypertension  Hyperlipidemia  BPH    Plan:  S/p CABG x 4v on 9/11  Larson: dc'd   Chest tubes and pacers dc'd by CTS today 9/14  Routine post op care  OOB, IS  Cardiac Rehab consulted  Pt/ot consulted  C/w  ASA, BB, statin  Electrolyte replacement per sliding scale  Leukocytosis:resolved  Post-op anemia: will transfuse 1 unit pRBC  Step to cardiology   Gi ppx: pepcid  dvt ppx: oob,  Code: full    Plan update today:  Continue care.  The patient is slowly improving.  Transfered to telemetry 9/15.  Chest tube/wires DCd 9/14.  Still a little tachycardic but patient ambulating without any symptoms. Would not recommend transfusion at this point but continue to monitor closely.  May need additional beta-blockade?  Consider 1 unit PRBC transfusion? Hematocrit has slightly dropped status post 1 unit  PRBC transfusion 9/14.  Continue serial labs.  Pacemaker interrogation  Continue Toprol to 100 mg daily  Continue home Flomax  Mobilize out of bed with PT/OT  Continue medical management including oral Lasix, statin, and aspirin  Monitor electrolytes and replace as needed including potassium.  GI prophylaxis with Pepcid  VTE prophylaxis with Lovenox  High risk secondary to severe exacerbation of chronic illness; persistent tachycardia requiring ongoing titration of medical regimen; persistent anemia requiring transfusion may need further blood transfusion  Disposition:  Possible discharge home next 24-48 hours pending course  I discussed the case with Cardiology (JOSÉ Dubon).    VTE Risk Mitigation (From admission, onward)         Ordered     enoxaparin injection 40 mg  Every 24 hours      09/14/20 1412                Pedro Blum MD  Department of Hospital Medicine   [facility]

## 2020-09-16 NOTE — PLAN OF CARE
Important Message from Medicare was sign, explained and given to patient/caregiver on 09/16/2020 at 9:13am     answered all questions.

## 2020-09-17 VITALS
TEMPERATURE: 99 F | RESPIRATION RATE: 16 BRPM | BODY MASS INDEX: 29.54 KG/M2 | WEIGHT: 211 LBS | SYSTOLIC BLOOD PRESSURE: 148 MMHG | HEART RATE: 112 BPM | HEIGHT: 71 IN | DIASTOLIC BLOOD PRESSURE: 77 MMHG | OXYGEN SATURATION: 99 %

## 2020-09-17 LAB
ANION GAP SERPL CALC-SCNC: 9 MMOL/L (ref 8–16)
BUN SERPL-MCNC: 23 MG/DL (ref 8–23)
CALCIUM SERPL-MCNC: 8.8 MG/DL (ref 8.7–10.5)
CHLORIDE SERPL-SCNC: 104 MMOL/L (ref 95–110)
CO2 SERPL-SCNC: 26 MMOL/L (ref 23–29)
CREAT SERPL-MCNC: 1 MG/DL (ref 0.5–1.4)
ERYTHROCYTE [DISTWIDTH] IN BLOOD BY AUTOMATED COUNT: 14 % (ref 11.5–14.5)
EST. GFR  (AFRICAN AMERICAN): >60 ML/MIN/1.73 M^2
EST. GFR  (NON AFRICAN AMERICAN): >60 ML/MIN/1.73 M^2
GLUCOSE SERPL-MCNC: 109 MG/DL (ref 70–110)
HCT VFR BLD AUTO: 27.2 % (ref 40–54)
HGB BLD-MCNC: 8.8 G/DL (ref 14–18)
MAGNESIUM SERPL-MCNC: 2.1 MG/DL (ref 1.6–2.6)
MCH RBC QN AUTO: 30.8 PG (ref 27–31)
MCHC RBC AUTO-ENTMCNC: 32.4 G/DL (ref 32–36)
MCV RBC AUTO: 95 FL (ref 82–98)
PLATELET # BLD AUTO: 263 K/UL (ref 150–350)
PLATELET BLD QL SMEAR: NORMAL
PMV BLD AUTO: 9.7 FL (ref 9.2–12.9)
POTASSIUM SERPL-SCNC: 3.7 MMOL/L (ref 3.5–5.1)
RBC # BLD AUTO: 2.86 M/UL (ref 4.6–6.2)
SODIUM SERPL-SCNC: 139 MMOL/L (ref 136–145)
TSH SERPL DL<=0.005 MIU/L-ACNC: 4.51 UIU/ML (ref 0.34–5.6)
WBC # BLD AUTO: 12.1 K/UL (ref 3.9–12.7)

## 2020-09-17 PROCEDURE — 25000003 PHARM REV CODE 250: Performed by: STUDENT IN AN ORGANIZED HEALTH CARE EDUCATION/TRAINING PROGRAM

## 2020-09-17 PROCEDURE — 25000003 PHARM REV CODE 250: Performed by: INTERNAL MEDICINE

## 2020-09-17 PROCEDURE — 84443 ASSAY THYROID STIM HORMONE: CPT

## 2020-09-17 PROCEDURE — 83735 ASSAY OF MAGNESIUM: CPT

## 2020-09-17 PROCEDURE — 85027 COMPLETE CBC AUTOMATED: CPT

## 2020-09-17 PROCEDURE — 80048 BASIC METABOLIC PNL TOTAL CA: CPT

## 2020-09-17 PROCEDURE — 99900035 HC TECH TIME PER 15 MIN (STAT)

## 2020-09-17 PROCEDURE — 94761 N-INVAS EAR/PLS OXIMETRY MLT: CPT

## 2020-09-17 PROCEDURE — 36415 COLL VENOUS BLD VENIPUNCTURE: CPT

## 2020-09-17 RX ORDER — METOPROLOL SUCCINATE 100 MG/1
100 TABLET, EXTENDED RELEASE ORAL 2 TIMES DAILY
Qty: 60 TABLET | Refills: 11 | Status: SHIPPED | OUTPATIENT
Start: 2020-09-17 | End: 2021-09-17

## 2020-09-17 RX ORDER — METOPROLOL SUCCINATE 50 MG/1
100 TABLET, EXTENDED RELEASE ORAL 2 TIMES DAILY
Status: DISCONTINUED | OUTPATIENT
Start: 2020-09-17 | End: 2020-09-17 | Stop reason: HOSPADM

## 2020-09-17 RX ADMIN — TAMSULOSIN HYDROCHLORIDE 0.4 MG: 0.4 CAPSULE ORAL at 08:09

## 2020-09-17 RX ADMIN — AMLODIPINE BESYLATE 5 MG: 5 TABLET ORAL at 08:09

## 2020-09-17 RX ADMIN — DOCUSATE SODIUM 100 MG: 100 CAPSULE, LIQUID FILLED ORAL at 08:09

## 2020-09-17 RX ADMIN — ATORVASTATIN CALCIUM 40 MG: 40 TABLET, FILM COATED ORAL at 08:09

## 2020-09-17 RX ADMIN — EZETIMIBE 10 MG: 10 TABLET ORAL at 08:09

## 2020-09-17 RX ADMIN — POTASSIUM CHLORIDE 40 MEQ: 1.5 POWDER, FOR SOLUTION ORAL at 08:09

## 2020-09-17 RX ADMIN — FAMOTIDINE 20 MG: 20 TABLET ORAL at 08:09

## 2020-09-17 RX ADMIN — ASPIRIN 81 MG: 81 TABLET, DELAYED RELEASE ORAL at 08:09

## 2020-09-17 RX ADMIN — METOPROLOL SUCCINATE 100 MG: 50 TABLET, FILM COATED, EXTENDED RELEASE ORAL at 08:09

## 2020-09-17 NOTE — PLAN OF CARE
09/17/20 1116   Final Note   Assessment Type Final Discharge Note   Anticipated Discharge Disposition Home  (DC After TSH level drawn and if normal, resulted at 4.510 WNL)   Post-Acute Status   Post-Acute Authorization Other   Other Status No Post-Acute Service Needs   Discharge Delays None known at this time

## 2020-09-17 NOTE — NURSING
Went over discharge information with the patient answered questions they had at that time. Removed 1 R TLC without difficulty tip intact. Patient left with clothing shoes flip cell phone and cell  1 filled  rx from [facility] downstairs. Patient wheeled out to car where son drove him home.

## 2020-09-17 NOTE — PLAN OF CARE
This note also relates to the following rows which could not be included:  Pulse - Cannot attach notes to unvalidated device data       09/17/20 0820   Patient Assessment/Suction   Level of Consciousness (AVPU) alert   All Lung Fields Breath Sounds clear   PRE-TX-O2   O2 Device (Oxygen Therapy) room air   SpO2 99 %   Pulse Oximetry Type Continuous   $ Pulse Oximetry - Multiple Charge Pulse Oximetry - Multiple   Resp 16   Aerosol Therapy   $ Aerosol Therapy Charges PRN treatment not required   Respiratory Evaluation   $ Care Plan Tech Time 15 min

## 2020-09-17 NOTE — DISCHARGE SUMMARY
[facility]  Cardiology  Discharge Summary      Patient Name: Juwan Silver  MRN: [id number]  Admission Date: 9/8/2020  Hospital Length of Stay: 9 days  Discharge Date and Time:  09/17/2020 10:27 AM  Attending Physician: Marko Pike MD  Discharging Provider: Chris Palacio PA-C  Primary Care Physician: Primary Doctor Karen    HPI: S/P CABG    Procedure(s) (LRB):  CORONARY ARTERY BYPASS GRAFT (CABG) (N/A)     Indwelling Lines/Drains at time of discharge:  Lines/Drains/Airways     Central Venous Catheter Line            Percutaneous Central Line Insertion/Assessment - Triple Lumen  09/11/20 0616 6 days                Hospital Course This is a 70-year-old male with a known history of CAD and atrial fibrillation who presented for outpatient left heart catheterization following a detection of prolonged VT on the patient's loop recorder. The patient was found to have significant left main disease and Cardiothoracic surgery was consulted. The patient underwent 4v CABG 09/11 (LIMA-LAD, SVG-PDA, SVG-OM, SVG-Diagonal). No intraoperative complications. The patient did well in the postoperative period. He was intermittently anemic requiring 1 unit PRBC transfusion. Experienced one episode NSVT. He was consistently tachycardic throughout his admission but CTA was ruled out and vital signs otherwise stable. His metoprolol was increased to 100 mg BID. PPM interrogation was performed on admission and we will obtain these results.  The patient will be discharged today with instructions to follow up with Dr. Pike in approximately 2-3 weeks and Dr. Saravia in approximately 2-3 weeks.    Consults:   Consults (From admission, onward)        Status Ordering Provider     Inpatient consult to Cardiothoracic Surgery  Once     Provider:  Blu Saravia MD    Completed CHRIS PALACIO            Pending Diagnostic Studies:     None          Final Active Diagnoses:    Diagnosis Date Noted POA    PRINCIPAL  PROBLEM:  S/P CABG x 4 [Z95.1]  Not Applicable    CAD, multiple vessel [I25.10] 09/09/2020 Yes    HTN (hypertension) [I10] 09/09/2020 Yes    Chronic atrial fibrillation [I48.20] 09/09/2020 Yes    Pacemaker [Z95.0] 09/09/2020 Yes    Abnormal stress test [R94.39] 09/08/2020 Yes    Atherosclerosis of native coronary artery without angina pectoris [I25.10] 09/08/2020 Yes      Problems Resolved During this Admission:       Discharged Condition: good    Follow Up:  Follow-up Information     Marko Pike MD In 2 weeks.    Specialty: Cardiology  Contact information:  [address]  [address]  [facility]  [place] [address] [phone]             Blu Saravia MD In 3 weeks.    Specialties: Cardiothoracic Surgery, Vascular Surgery, Cardiovascular Disease, Cardiology  Contact information:  [address]  [address]  [place] [address]                 Patient Instructions:   No discharge procedures on file.  Medications:  Reconciled Home Medications:      Medication List      START taking these medications    HYDROcodone-acetaminophen 5-325 mg per tablet  Commonly known as: NORCO  Take 1 tablet by mouth every 6 (six) hours as needed for Pain.        CHANGE how you take these medications    metoprolol succinate 100 MG 24 hr tablet  Commonly known as: TOPROL-XL  Take 1 tablet (100 mg total) by mouth 2 (two) times daily.  What changed:   · medication strength  · how much to take  · when to take this        CONTINUE taking these medications    amLODIPine 5 MG tablet  Commonly known as: NORVASC  Take 5 mg by mouth once daily.     atorvastatin 40 MG tablet  Commonly known as: LIPITOR  Take 40 mg by mouth once daily.     cyclobenzaprine 5 MG tablet  Commonly known as: FLEXERIL  TAKE 1 TABLET BY MOUTH TWICE DAILY AS NEEDED     tamsulosin 0.4 mg Cap  Commonly known as: FLOMAX  0.4 mg once daily.     valsartan-hydrochlorothiazide 320-12.5 mg per tablet  Commonly known as: DIOVAN-HCT  Take 1 tablet by mouth  once daily.     XARELTO 20 mg Tab  Generic drug: rivaroxaban  Take 20 mg by mouth once daily.     ZETIA 10 mg tablet  Generic drug: ezetimibe  Take 10 mg by mouth once daily.        STOP taking these medications    meloxicam 7.5 MG tablet  Commonly known as: MOBIC     potassium chloride 10 MEQ Tbsr  Commonly known as: KLOR-CON            Time spent on the discharge of patient: 20 minutes    Chris Dubon PA-C  Cardiology  [facility]

## 2020-09-17 NOTE — PROGRESS NOTES
[facility]    Cardiology Progress Note    Subjective: Patient seen and examined this AM. Discussed plan with Dr. Blum yesterday and agreed to monitor CBC without transfusion yesterday. Patients hemoglobin 8.8 today. However patient remains tachycardic. VSS otherwise and patient feels well. No prior TSH. Ready for home. PPM interrogation performed last night, will seek to obtain these results.    Objective:  Vital Signs (Most Recent)  Temp: 98.8 °F (37.1 °C) (09/17/20 0700)  Pulse: (!) 112 (09/17/20 0847)  Resp: 16 (09/17/20 0847)  BP: (!) 148/77 (09/17/20 0300)  SpO2: 99 % (09/17/20 0847)    Vital Signs Range (Last 24H):  Temp:  [97.7 °F (36.5 °C)-98.8 °F (37.1 °C)]   Pulse:  []   Resp:  [16-20]   BP: (133-157)/(67-91)   SpO2:  [93 %-99 %]     I & O (Last 24H):  No intake or output data in the 24 hours ending 09/17/20 0914    Current Diet:     Current Diet Order   Procedures    Diet Cardiac        Allergies:  Review of patient's allergies indicates:   Allergen Reactions    Nitroglycerin Other (See Comments)     Hyper sensitive       Meds:  Scheduled Meds:   amLODIPine  5 mg Oral Daily    aspirin  81 mg Oral Daily    atorvastatin  40 mg Oral Daily    docusate sodium  100 mg Oral BID    enoxaparin  40 mg Subcutaneous Q24H    ezetimibe  10 mg Oral Daily    famotidine  20 mg Oral BID    metoprolol succinate  100 mg Oral Daily    polyethylene glycol  17 g Oral Daily    tamsulosin  0.4 mg Oral Daily     Continuous Infusions:    PRN Meds:sodium chloride, acetaminophen, albumin human 5%, calcium gluconate IVPB, calcium gluconate IVPB, calcium gluconate IVPB, cloNIDine, dextrose 50%, dextrose 50%, HYDROcodone-acetaminophen, HYDROcodone-acetaminophen, magnesium oxide, magnesium oxide, magnesium sulfate IVPB, magnesium sulfate IVPB, morphine, ondansetron, potassium chloride, potassium chloride, potassium, sodium phosphates, potassium, sodium phosphates, potassium, sodium phosphates, sodium  "phosphate IVPB, sodium phosphate IVPB, sodium phosphate IVPB    Lab Results :  Recent Results (from the past 24 hour(s))   Basic metabolic panel    Collection Time: 09/17/20  4:46 AM   Result Value Ref Range    Sodium 139 136 - 145 mmol/L    Potassium 3.7 3.5 - 5.1 mmol/L    Chloride 104 95 - 110 mmol/L    CO2 26 23 - 29 mmol/L    Glucose 109 70 - 110 mg/dL    BUN, Bld 23 8 - 23 mg/dL    Creatinine 1.0 0.5 - 1.4 mg/dL    Calcium 8.8 8.7 - 10.5 mg/dL    Anion Gap 9 8 - 16 mmol/L    eGFR if African American >60.0 >60 mL/min/1.73 m^2    eGFR if non African American >60.0 >60 mL/min/1.73 m^2   CBC Without Differential    Collection Time: 09/17/20  4:46 AM   Result Value Ref Range    WBC 12.10 3.90 - 12.70 K/uL    RBC 2.86 (L) 4.60 - 6.20 M/uL    Hemoglobin 8.8 (L) 14.0 - 18.0 g/dL    Hematocrit 27.2 (L) 40.0 - 54.0 %    Mean Corpuscular Volume 95 82 - 98 fL    Mean Corpuscular Hemoglobin 30.8 27.0 - 31.0 pg    Mean Corpuscular Hemoglobin Conc 32.4 32.0 - 36.0 g/dL    RDW 14.0 11.5 - 14.5 %    Platelets 263 150 - 350 K/uL    MPV 9.7 9.2 - 12.9 fL   Magnesium    Collection Time: 09/17/20  4:46 AM   Result Value Ref Range    Magnesium 2.1 1.6 - 2.6 mg/dL   Platelet Review    Collection Time: 09/17/20  4:46 AM   Result Value Ref Range    Platelet Estimate Appears normal          Physical Exam   Objective:  General Appearance:  Comfortable.    Vital signs: (most recent): Blood pressure (!) 148/77, pulse (!) 112, temperature 98.8 °F (37.1 °C), temperature source Oral, resp. rate 16, height 5' 11" (1.803 m), weight 95.7 kg (210 lb 15.7 oz), SpO2 99 %.    Lungs:  Normal effort and normal respiratory rate.  Breath sounds clear to auscultation.  (Chest tubes )  Heart: Tachycardia.  Regular rhythm.  S1 normal and S2 normal.  No gallop or friction rub.   Abdomen: Abdomen is soft.  Bowel sounds are normal.   There is no abdominal tenderness.     Extremities: Normal range of motion.    Neurological: Patient is alert and oriented to " person, place and time.        Current Consults:  IP CONSULT TO CARDIOTHORACIC SURGERY    Assessment/Plan:  Assessment:   1. CAD/LM disease - s/p CABG x4  2. History of paroxysmal atrial fibrillation  3. Status post multiple ablations  4. History of DDD pacemaker  5. Hypertension  6. Hyperlipidemia  7. BPH  8. Tachycardia    Plan:   Discharge pending once TSH results and if WNL.   Instructed to follow up with Dr. Pike in approximately 2-3 weeks. At this time we will discuss CP rehab.  Increase Toprol to 100 mg BID assuming TSH is WNL. Will monitor his PPM and Loop devices prior to next OV to evaluate trend of tachycardia.   Continue ASA, statin, CCB otherwise. Resume Xarelto.     Chris Dubon PA-C  09/17/2020

## 2020-09-17 NOTE — PLAN OF CARE
09/16/20 2007   PRE-TX-O2   O2 Device (Oxygen Therapy) room air   SpO2 97 %   Pulse Oximetry Type Continuous   $ Pulse Oximetry - Multiple Charge Pulse Oximetry - Multiple   Pulse 104   Resp 18   Respiratory Evaluation   $ Care Plan Tech Time 15 min   Evaluation For   (careplan)

## 2020-09-17 NOTE — PROGRESS NOTES
Cardiac Rehab     Juwan Silver   [id number] 9/17/2020         Cardiac Rehab Phase Taught: Phase 1    Teaching Method: Verbal, Written    Handouts: None    Educational Videos: None    Understanding:  Knowledge indicated by feedback, Learning indicated by feedback and Verbalize understanding    Comments: pt sitting up in chair, states he is being discharged today. Review of sternal precautions, IS use, s/s to report. Pt voiced understanding.     Total Time Spent:15mins            Sonali Bonds RN

## 2020-09-17 NOTE — PLAN OF CARE
09/17/20 1033   Post-Acute Status   Post-Acute Authorization Other   Other Status No Post-Acute Service Needs   Discharge Delays None known at this time  (DC after TSH level done, call if abnormal:)   Discharge Plan   Discharge Plan A Home with family   Discharge Plan B Home with family

## 2020-09-17 NOTE — PROGRESS NOTES
Cardiac Rehab     Juwan Silver   [id number]   9/17/2020         Cardiac Rehab Phase Taught: Phase 1    Teaching Method: Verbal, Written    Handouts: Cardiac Diet Pack, Cardiac Rehab Tear Sheet, Discharge Instructions First Two Weeks Post-Op, Home Activity Sheet, Home Walking Program Sheet and Post-op CABG Booklet    Educational Videos: None    Understanding:  Learning indicated by feedback and Verbalize understanding    Comments: pt sitting up in chair, review of discharge cabg education including wound care, sternal precautions, s/s to report, meds, cardiac rehab. Pt voiced understanding. Pt is interested in cardiac rehab. Will follow as oupt for appt    Total Time Spent:30mins            Sonali Bonds RN

## 2020-09-17 NOTE — PROGRESS NOTES
[facility] Medicine  Progress Note    Patient Name: Juwan Silver  MRN: [id number]  Patient Class: IP- Inpatient   Admission Date: 2020  Attending Physician: Marko Pike MD  Primary Care Provider: Primary Doctor No   Date of service: 2020    Subjective:     Principal Problem:Atherosclerosis of native coronary artery without angina pectoris    Interval History: POD#6 s/p CABGx4  The patient reports feeling well.  He is minimal if any postoperative site soreness of chest.  No shortness of breath, lightheadedness, or dizziness at rest or with ambulation.  He is mobilizing independently without difficulty.  Eating well with no nausea or vomiting.  He is eager for discharge home.    Scheduled Meds:   amLODIPine  5 mg Oral Daily    aspirin  81 mg Oral Daily    atorvastatin  40 mg Oral Daily    docusate sodium  100 mg Oral BID    enoxaparin  40 mg Subcutaneous Q24H    ezetimibe  10 mg Oral Daily    famotidine  20 mg Oral BID    metoprolol succinate  100 mg Oral Daily    polyethylene glycol  17 g Oral Daily    tamsulosin  0.4 mg Oral Daily     Continuous Infusions:    PRN Meds:sodium chloride, acetaminophen, albumin human 5%, calcium gluconate IVPB, calcium gluconate IVPB, calcium gluconate IVPB, cloNIDine, dextrose 50%, dextrose 50%, HYDROcodone-acetaminophen, HYDROcodone-acetaminophen, magnesium oxide, magnesium oxide, magnesium sulfate IVPB, magnesium sulfate IVPB, morphine, ondansetron, potassium chloride, potassium chloride, potassium, sodium phosphates, potassium, sodium phosphates, potassium, sodium phosphates, sodium phosphate IVPB, sodium phosphate IVPB, sodium phosphate IVPB    Review of patient's allergies indicates:   Allergen Reactions    Nitroglycerin Other (See Comments)     Hyper sensitive     Objectives:     Vitals(Most Recent)      BP  Min: 133/71  Max: 157/91  Temp  Av.4 °F (36.9 °C)  Min: 97.7 °F (36.5 °C)  Max: 98.8 °F (37.1 °C)  Pulse  Av.5   Min: 96  Max: 112  Resp  Av  Min: 16  Max: 20  SpO2  Av.2 %  Min: 93 %  Max: 99 %             Vitals([id number])  Temp:  [97.7 °F (36.5 °C)-98.8 °F (37.1 °C)]   Pulse:  []   Resp:  [16-20]   BP: (133-157)/(67-91)   SpO2:  [93 %-99 %]     I & O([id number])    Intake/Output Summary (Last 24 hours) at 2020 09  Last data filed at 2020 0900  Gross per 24 hour   Intake 240 ml   Output --   Net 240 ml       Physical Exam:   General:  Comfortable appearing, nontoxic, nondiaphoretic, sitting up in chair without difficulty  ENT:  Moist mucous membranes  CV: RRR.  2+ radial pulses bilaterally.  Postoperative site of chest intact without bleeding or drainage.  Pulmonary:  Comfortable work of breathing, lungs clear to auscultation bilaterally without crackles or wheezing  Neuro: alert, oriented x 4  Psych:  Mood is calm, affect normal, insight good, in good spirits    LABS  CBC  Recent Labs   Lab 09/15/20  0350 09/16/20  0430 09/17/20  0446   WBC 9.28 8.46 12.10   RBC 2.72* 2.55* 2.86*   HGB 8.3* 7.9* 8.8*   HCT 25.1* 23.5* 27.2*    170 263   MCV 92 92 95   MCH 30.5 31.0 30.8   MCHC 33.1 33.6 32.4       CMP  Recent Labs   Lab 09/15/20  03520    138 139   K 3.6 3.4* 3.7   CO2 25 27 26    103 104   BUN 30* 28* 23   CREATININE 1.0 0.9 1.0    108 109     Recent Labs   Lab 20  1152  20  0405 09/15/20  03520  044   CALCIUM 7.4*   < > 8.4* 8.4* 8.4* 8.8   MG 2.0   < > 2.0  --  2.1 2.1   PHOS 2.5*  --   --   --   --   --     < > = values in this interval not displayed.     Recent Labs   Lab 09/11/20  0410 09/15/20  0350   PROT 6.6 5.7*   ALBUMIN 4.1 3.2*   BILITOT 1.1* 1.5*   AST 29 107*   ALKPHOS 77 50*   ALT 35 62*         COAGS  Recent Labs   Lab 20   INR 1.2   APTT 34.4*     Imaging;  Chest x-ray personally reviewed :  Lung fields are clear without focal consolidation or pulmonary edema, cardiac device in  place left chest; post sternotomy; stable unchanged from previous    Bedside telemetry reviewed    Assessment/Plan:      Active Hospital Problems    Diagnosis  POA    *Atherosclerosis of native coronary artery without angina pectoris [I25.10]  Yes    S/P CABG x 4 [Z95.1]  Not Applicable    CAD, multiple vessel [I25.10]  Yes    HTN (hypertension) [I10]  Yes    Chronic atrial fibrillation [I48.20]  Yes    Pacemaker [Z95.0]  Yes    Abnormal stress test [R94.39]  Yes   Multi-vessel CAD status post CABG x4  Acute blood loss anemia status post transfusion  Thrombocytopenia secondary to acute surgical blood loss, resolved  Postoperative hypoxemia, improving  Hypokalemia  Mild transaminitis  Paroxysmal atrial fibrillation  Hypertension  Hyperlipidemia  BPH    Plan:  S/p CABG x 4v on 9/11  Lrason: dc'd   Chest tubes and pacers dc'd by CTS today 9/14  Routine post op care  OOB, IS  Cardiac Rehab consulted  Pt/ot consulted  C/w  ASA, BB, statin  Electrolyte replacement per sliding scale  Leukocytosis:resolved  Post-op anemia: will transfuse 1 unit pRBC  Step to cardiology   Gi ppx: pepcid  dvt ppx: oob,  Code: full    Plan update today:  Patient is much improved. POD#6 s/p CABGx4. Chest tube/wires DCd 9/14. Transfered to telemetry 9/15.    H&H improved today, overall stable status post 1 unit PRBC transfusion 9/14.  Remains tachycardic but ambulating without any symptoms. May need additional beta-blockade?  Defer to cardiology.  Pacemaker interrogation  Agree with TSH measurement  Continue Toprol to 100 mg daily and follow up with Cardiology for titration is needed  Continue home Flomax  Mobilize out of bed with PT/OT  Continue medical management including oral Lasix, statin, and aspirin  Monitor electrolytes and replace as needed including potassium.  GI prophylaxis with Pepcid  VTE prophylaxis with Lovenox  MEDICALLY STABLE FOR DISCHARGE HOME IF TSH NORMAL.  PLEASE CALL WITH ANY FURTHER QUESTIONS.  WILL SIGN OFF IF TSH  NORMAL.    VTE Risk Mitigation (From admission, onward)         Ordered     enoxaparin injection 40 mg  Every 24 hours      09/14/20 1419                Pedro Blum MD  Department of Hospital Medicine   [facility]

## 2020-09-18 ENCOUNTER — TELEPHONE (OUTPATIENT)
Dept: VASCULAR SURGERY | Facility: CLINIC | Age: 70
End: 2020-09-18

## 2020-09-18 ENCOUNTER — TELEPHONE (OUTPATIENT)
Dept: CARDIAC REHAB | Facility: HOSPITAL | Age: 70
End: 2020-09-18

## 2020-09-18 NOTE — TELEPHONE ENCOUNTER
Juwan Silver   [id number] 9/18/2020         Spoke with: pt    Received Medications?:yes    Follow Up Appt?:yes    Cardio Pulmonary Rehab Appt?:no    Comments: pt states he is doing well, he will speak with Dr Pike about rehab, will call us    Sonali Bonds RN

## 2020-09-18 NOTE — TELEPHONE ENCOUNTER
----- Message from Felix Bronson sent at 9/18/2020  2:13 PM CDT -----  Type: Needs Medical Advice    Who Called:  Kanwal (Spouse)  Best Call Back Number: [phone]  Additional Information: Caller would like to discuss scheduling post-op/follow up appointment. Please call to advise. Thanks!

## 2020-09-30 ENCOUNTER — OFFICE VISIT (OUTPATIENT)
Dept: VASCULAR SURGERY | Facility: CLINIC | Age: 70
End: 2020-09-30
Payer: MEDICARE

## 2020-09-30 VITALS
HEART RATE: 83 BPM | HEIGHT: 71 IN | BODY MASS INDEX: 29.43 KG/M2 | SYSTOLIC BLOOD PRESSURE: 98 MMHG | DIASTOLIC BLOOD PRESSURE: 57 MMHG

## 2020-09-30 DIAGNOSIS — Z95.1 S/P CABG (CORONARY ARTERY BYPASS GRAFT): Primary | ICD-10-CM

## 2020-09-30 PROCEDURE — 99024 PR POST-OP FOLLOW-UP VISIT: ICD-10-PCS | Mod: POP,,, | Performed by: THORACIC SURGERY (CARDIOTHORACIC VASCULAR SURGERY)

## 2020-09-30 PROCEDURE — 99999 PR PBB SHADOW E&M-EST. PATIENT-LVL III: ICD-10-PCS | Mod: PBBFAC,,, | Performed by: THORACIC SURGERY (CARDIOTHORACIC VASCULAR SURGERY)

## 2020-09-30 PROCEDURE — 99999 PR PBB SHADOW E&M-EST. PATIENT-LVL III: CPT | Mod: PBBFAC,,, | Performed by: THORACIC SURGERY (CARDIOTHORACIC VASCULAR SURGERY)

## 2020-09-30 PROCEDURE — 99024 POSTOP FOLLOW-UP VISIT: CPT | Mod: POP,,, | Performed by: THORACIC SURGERY (CARDIOTHORACIC VASCULAR SURGERY)

## 2020-09-30 PROCEDURE — 99213 OFFICE O/P EST LOW 20 MIN: CPT | Mod: PBBFAC,PO | Performed by: THORACIC SURGERY (CARDIOTHORACIC VASCULAR SURGERY)

## 2020-09-30 NOTE — PROGRESS NOTES
This patient is status post coronary artery bypass grafting.  He comes back to the office today in follow-up.  He has done well overall and has slowly improved.  He denies any major chest discomfort or pain.  His medicines are noted and are part epic record.  He does take Xarelto.  On physical exam vital signs are stable.  His surgical wounds are clean and dry without evidence of infection.  His chest tube stitches were removed.  At this point he is doing well status post coronary artery bypass grafting.  Supportive care is ongoing.  He should ambulate on a regular basis.  I will defer medical management to his cardiologist.  He can see me on an as-needed basis by would anticipate fairly good long-term prognosis.

## 2021-01-13 ENCOUNTER — IMMUNIZATION (OUTPATIENT)
Dept: PRIMARY CARE CLINIC | Facility: CLINIC | Age: 71
End: 2021-01-13

## 2021-01-13 DIAGNOSIS — Z23 NEED FOR VACCINATION: ICD-10-CM

## 2021-01-13 PROCEDURE — 91300 COVID-19, MRNA, LNP-S, PF, 30 MCG/0.3 ML DOSE VACCINE: ICD-10-PCS | Mod: S$GLB,,, | Performed by: FAMILY MEDICINE

## 2021-01-13 PROCEDURE — 0001A COVID-19, MRNA, LNP-S, PF, 30 MCG/0.3 ML DOSE VACCINE: ICD-10-PCS | Mod: CV19,S$GLB,, | Performed by: FAMILY MEDICINE

## 2021-01-13 PROCEDURE — 0001A COVID-19, MRNA, LNP-S, PF, 30 MCG/0.3 ML DOSE VACCINE: CPT | Mod: CV19,S$GLB,, | Performed by: FAMILY MEDICINE

## 2021-01-13 PROCEDURE — 91300 COVID-19, MRNA, LNP-S, PF, 30 MCG/0.3 ML DOSE VACCINE: CPT | Mod: S$GLB,,, | Performed by: FAMILY MEDICINE

## 2021-02-03 ENCOUNTER — IMMUNIZATION (OUTPATIENT)
Dept: PRIMARY CARE CLINIC | Facility: CLINIC | Age: 71
End: 2021-02-03
Payer: MEDICARE

## 2021-02-03 DIAGNOSIS — Z23 NEED FOR VACCINATION: Primary | ICD-10-CM

## 2021-02-03 PROCEDURE — 0002A COVID-19, MRNA, LNP-S, PF, 30 MCG/0.3 ML DOSE VACCINE: ICD-10-PCS | Mod: S$GLB,,, | Performed by: FAMILY MEDICINE

## 2021-02-03 PROCEDURE — 0002A COVID-19, MRNA, LNP-S, PF, 30 MCG/0.3 ML DOSE VACCINE: CPT | Mod: S$GLB,,, | Performed by: FAMILY MEDICINE

## 2021-02-03 PROCEDURE — 91300 COVID-19, MRNA, LNP-S, PF, 30 MCG/0.3 ML DOSE VACCINE: CPT | Mod: S$GLB,,, | Performed by: FAMILY MEDICINE

## 2021-02-03 PROCEDURE — 91300 COVID-19, MRNA, LNP-S, PF, 30 MCG/0.3 ML DOSE VACCINE: ICD-10-PCS | Mod: S$GLB,,, | Performed by: FAMILY MEDICINE

## 2021-09-28 ENCOUNTER — IMMUNIZATION (OUTPATIENT)
Dept: PRIMARY CARE CLINIC | Facility: CLINIC | Age: 71
End: 2021-09-28

## 2021-09-28 DIAGNOSIS — Z23 NEED FOR VACCINATION: Primary | ICD-10-CM

## 2021-09-28 PROCEDURE — 91300 COVID-19, MRNA, LNP-S, PF, 30 MCG/0.3 ML DOSE VACCINE: CPT | Mod: S$GLB,,, | Performed by: FAMILY MEDICINE

## 2021-09-28 PROCEDURE — 0003A COVID-19, MRNA, LNP-S, PF, 30 MCG/0.3 ML DOSE VACCINE: CPT | Mod: S$GLB,,, | Performed by: FAMILY MEDICINE

## 2021-09-28 PROCEDURE — 0003A COVID-19, MRNA, LNP-S, PF, 30 MCG/0.3 ML DOSE VACCINE: ICD-10-PCS | Mod: S$GLB,,, | Performed by: FAMILY MEDICINE

## 2021-09-28 PROCEDURE — 91300 COVID-19, MRNA, LNP-S, PF, 30 MCG/0.3 ML DOSE VACCINE: ICD-10-PCS | Mod: S$GLB,,, | Performed by: FAMILY MEDICINE

## 2024-10-18 NOTE — PLAN OF CARE
Important Message from Medicare was sign, explained and given to patient/caregiver on 09/10/2020 at 10:14am   No

## (undated) DEVICE — Device

## (undated) DEVICE — RESERVOIR FRESENIUS 9108474

## (undated) DEVICE — SHEATH INTRODUCER PRECISION ACCESS 6FX10

## (undated) DEVICE — GLOVE #7.5 BIOGEL 30475

## (undated) DEVICE — TUBING SUCTION FRESENIUS 9108484

## (undated) DEVICE — GLOVE SURG BIOGEL LTX PF ST SZ 6.5

## (undated) DEVICE — CLIP APPLIER MCS20 STERILMED ETHMCS20

## (undated) DEVICE — GUIDEWIRE DOUBLE ENDED .035 DIA. 150CML

## (undated) DEVICE — MARKER SKIN W/ RULER 77734

## (undated) DEVICE — CLIP APPLIER MSM20 STERILMED  ETHMSM20

## (undated) DEVICE — SUTURE STEEL 6 18 M654G

## (undated) DEVICE — SOLUTION PREP IODINE 4OZ

## (undated) DEVICE — BLANKET HYPOTHERMIA LARGE

## (undated) DEVICE — DRAPE BILATERAL LEG 84X80

## (undated) DEVICE — BAG DECANTER 10-102

## (undated) DEVICE — TRAY CV ACCESS W AUX B

## (undated) DEVICE — SET AUTOTRANSFUSION FRESENIUS 9005104

## (undated) DEVICE — KIT SURGICAL SUTURE ECK

## (undated) DEVICE — CANNULA VENOUS 33/43FR TF33430

## (undated) DEVICE — TUBING M/M PRESSURE LL 72

## (undated) DEVICE — SOLUTION SCRUB IODINE 4OZ

## (undated) DEVICE — CANNULA 14FR RC2014

## (undated) DEVICE — CATHETER THORACIC 28FR 8028

## (undated) DEVICE — GLOVE BIOGEL MICRO SURGEON PINK SZ 7

## (undated) DEVICE — DRAPE IOBAN 23X17 6650EZ

## (undated) DEVICE — DRAPE SPLIT CV 66350

## (undated) DEVICE — TUBING INSUFFLATION 10FT

## (undated) DEVICE — TRAY CV ACCESS W/AUX A

## (undated) DEVICE — HEMOSTAT FIBRILLAR 2X4 1962

## (undated) DEVICE — DRESSING POST OP MEPILEX  AG  4X12

## (undated) DEVICE — TUBING EASY SPRAY TISSEEL

## (undated) DEVICE — DRAIN CHEST DRY SUCTION

## (undated) DEVICE — UNDERGLOVE BIOGEL PI MICRO BLUE SZ 6.5

## (undated) DEVICE — CANNULA SELECT 3D II 22FR 78522

## (undated) DEVICE — PREP CHLORA 26ML

## (undated) DEVICE — SHEATH INTRODUCER SLENDER 6FX10CM

## (undated) DEVICE — DRAPE IOBAN 23X33 6651EZ

## (undated) DEVICE — TIP BOVIE 6.5 0014

## (undated) DEVICE — HEMOSTAT OSTENE 2.5GR

## (undated) DEVICE — DRESSING POST OP MEPILEX AG 4X6  498300

## (undated) DEVICE — SYSTEM VEIN HARVESTING VSP550

## (undated) DEVICE — CATHETER EXPO MLTPK 6FR 100X110CM

## (undated) DEVICE — NEEDLE SAFETY COMBO 3ML 25G 5/8IN 305781

## (undated) DEVICE — CLIP LIGACLIP LIGATING TITANIUM LG LT400

## (undated) DEVICE — SOLUTION NACL 0.9% 3000ML

## (undated) DEVICE — PACK PERFUSION

## (undated) DEVICE — SUCTION YANKAUER 34970

## (undated) DEVICE — PAD BOVIE ADULT

## (undated) DEVICE — MARKER CORONARY BYPASS GRAFT 40149

## (undated) DEVICE — CATHETER RADIAL TIG 4.0 OPTITORQUE 5X110

## (undated) DEVICE — SPONGE XRAY DETECTABLE 4X4

## (undated) DEVICE — TRAY SKIN PREP DRY